# Patient Record
Sex: FEMALE | Race: WHITE | NOT HISPANIC OR LATINO
[De-identification: names, ages, dates, MRNs, and addresses within clinical notes are randomized per-mention and may not be internally consistent; named-entity substitution may affect disease eponyms.]

---

## 2018-07-18 ENCOUNTER — TRANSCRIPTION ENCOUNTER (OUTPATIENT)
Age: 33
End: 2018-07-18

## 2018-07-19 ENCOUNTER — OUTPATIENT (OUTPATIENT)
Dept: OUTPATIENT SERVICES | Facility: HOSPITAL | Age: 33
LOS: 1 days | End: 2018-07-19
Payer: COMMERCIAL

## 2018-07-19 ENCOUNTER — RESULT REVIEW (OUTPATIENT)
Age: 33
End: 2018-07-19

## 2018-07-19 VITALS
DIASTOLIC BLOOD PRESSURE: 65 MMHG | TEMPERATURE: 99 F | HEIGHT: 62.5 IN | RESPIRATION RATE: 14 BRPM | HEART RATE: 80 BPM | SYSTOLIC BLOOD PRESSURE: 99 MMHG | WEIGHT: 113.98 LBS | OXYGEN SATURATION: 98 %

## 2018-07-19 VITALS
OXYGEN SATURATION: 100 % | HEART RATE: 72 BPM | SYSTOLIC BLOOD PRESSURE: 109 MMHG | RESPIRATION RATE: 18 BRPM | DIASTOLIC BLOOD PRESSURE: 66 MMHG | TEMPERATURE: 98 F

## 2018-07-19 DIAGNOSIS — Z98.891 HISTORY OF UTERINE SCAR FROM PREVIOUS SURGERY: Chronic | ICD-10-CM

## 2018-07-19 DIAGNOSIS — O02.1 MISSED ABORTION: ICD-10-CM

## 2018-07-19 DIAGNOSIS — Z98.890 OTHER SPECIFIED POSTPROCEDURAL STATES: Chronic | ICD-10-CM

## 2018-07-19 LAB
BLD GP AB SCN SERPL QL: NEGATIVE — SIGNIFICANT CHANGE UP
HCT VFR BLD CALC: 35.5 % — SIGNIFICANT CHANGE UP (ref 34.5–45)
HGB BLD-MCNC: 13 G/DL — SIGNIFICANT CHANGE UP (ref 11.5–15.5)
MCHC RBC-ENTMCNC: 34.1 PG — HIGH (ref 27–34)
MCHC RBC-ENTMCNC: 36.6 GM/DL — HIGH (ref 32–36)
MCV RBC AUTO: 93.1 FL — SIGNIFICANT CHANGE UP (ref 80–100)
PLATELET # BLD AUTO: 292 K/UL — SIGNIFICANT CHANGE UP (ref 150–400)
RBC # BLD: 3.81 M/UL — SIGNIFICANT CHANGE UP (ref 3.8–5.2)
RBC # FLD: 11.5 % — SIGNIFICANT CHANGE UP (ref 10.3–14.5)
RH IG SCN BLD-IMP: POSITIVE — SIGNIFICANT CHANGE UP
WBC # BLD: 11.1 K/UL — HIGH (ref 3.8–10.5)
WBC # FLD AUTO: 11.1 K/UL — HIGH (ref 3.8–10.5)

## 2018-07-19 PROCEDURE — 88233 TISSUE CULTURE SKIN/BIOPSY: CPT

## 2018-07-19 PROCEDURE — 86900 BLOOD TYPING SEROLOGIC ABO: CPT

## 2018-07-19 PROCEDURE — 88305 TISSUE EXAM BY PATHOLOGIST: CPT

## 2018-07-19 PROCEDURE — 86901 BLOOD TYPING SEROLOGIC RH(D): CPT

## 2018-07-19 PROCEDURE — 86850 RBC ANTIBODY SCREEN: CPT

## 2018-07-19 PROCEDURE — 88280 CHROMOSOME KARYOTYPE STUDY: CPT

## 2018-07-19 PROCEDURE — 88264 CHROMOSOME ANALYSIS 20-25: CPT

## 2018-07-19 PROCEDURE — 59820 CARE OF MISCARRIAGE: CPT

## 2018-07-19 PROCEDURE — 85027 COMPLETE CBC AUTOMATED: CPT

## 2018-07-19 PROCEDURE — 88291 CYTO/MOLECULAR REPORT: CPT

## 2018-07-19 PROCEDURE — 88305 TISSUE EXAM BY PATHOLOGIST: CPT | Mod: 26

## 2018-07-19 RX ORDER — ONDANSETRON 8 MG/1
4 TABLET, FILM COATED ORAL ONCE
Refills: 0 | Status: DISCONTINUED | OUTPATIENT
Start: 2018-07-19 | End: 2018-08-03

## 2018-07-19 RX ORDER — ACETAMINOPHEN 500 MG
1000 TABLET ORAL ONCE
Refills: 0 | Status: COMPLETED | OUTPATIENT
Start: 2018-07-19 | End: 2018-07-19

## 2018-07-19 RX ORDER — LIDOCAINE HCL 20 MG/ML
0.2 VIAL (ML) INJECTION ONCE
Refills: 0 | Status: DISCONTINUED | OUTPATIENT
Start: 2018-07-19 | End: 2018-08-03

## 2018-07-19 RX ORDER — CELECOXIB 200 MG/1
200 CAPSULE ORAL ONCE
Refills: 0 | Status: DISCONTINUED | OUTPATIENT
Start: 2018-07-19 | End: 2018-08-03

## 2018-07-19 RX ORDER — OXYCODONE HYDROCHLORIDE 5 MG/1
5 TABLET ORAL ONCE
Refills: 0 | Status: DISCONTINUED | OUTPATIENT
Start: 2018-07-19 | End: 2018-07-19

## 2018-07-19 RX ORDER — SODIUM CHLORIDE 9 MG/ML
1000 INJECTION, SOLUTION INTRAVENOUS
Refills: 0 | Status: DISCONTINUED | OUTPATIENT
Start: 2018-07-19 | End: 2018-08-03

## 2018-07-19 RX ORDER — CELECOXIB 200 MG/1
200 CAPSULE ORAL ONCE
Refills: 0 | Status: COMPLETED | OUTPATIENT
Start: 2018-07-19 | End: 2018-07-19

## 2018-07-19 RX ORDER — SODIUM CHLORIDE 9 MG/ML
3 INJECTION INTRAMUSCULAR; INTRAVENOUS; SUBCUTANEOUS EVERY 8 HOURS
Refills: 0 | Status: DISCONTINUED | OUTPATIENT
Start: 2018-07-19 | End: 2018-08-03

## 2018-07-19 RX ADMIN — Medication 1000 MILLIGRAM(S): at 14:25

## 2018-07-19 RX ADMIN — CELECOXIB 200 MILLIGRAM(S): 200 CAPSULE ORAL at 14:25

## 2018-07-19 NOTE — PRE-ANESTHESIA EVALUATION ADULT - NSANTHPMHFT_GEN_ALL_CORE
GERD x 1 episode several days ago, none today  occasional nausea with pregnancy, no vomiting, none today GERD x 1 episode several days ago, none today  occasional nausea with pregnancy, no vomiting, none today  8.5 weeks by size  11 wks by date

## 2018-07-19 NOTE — H&P PST ADULT - HISTORY OF PRESENT ILLNESS
33 yo female. . LMP4/.  IUP at 11.5 weeks gestation.  recent prenatal sono revealed negative fetal heart tone. presents to PST scheduled for suction currettage for missed .  *** pt stated that she ate 1 cup of yogurt and drink some water this morning at 6am, afterwards she had maintained NPO status, Dr. Nesbitt is aware and is ok to proceed with planned procedure, s/w DAVID RN, Lenora who contacted anesthesiologist in chart.  pt will maintain NPO status 8h prior to procedure.*** 33 yo female. . LMP4/.  IUP at 11.5 weeks gestation.  recent prenatal sono revealed negative fetal heart tone. presents to PST scheduled for suction currettage for missed .  *** pt stated that she ate 1 cup of yogurt and drink some water this morning at 6am, afterwards she had maintained NPO status, Dr. Nesbitt is aware and is ok to proceed with planned procedure, s/w DAVID RN, Lenora who contacted anesthesiologist in charge.  pt will maintain NPO status 8h prior to procedure.***

## 2018-07-19 NOTE — ASU DISCHARGE PLAN (ADULT/PEDIATRIC). - NURSING INSTRUCTIONS
Tylenol/Motrin as needed for pain.  Next doses OK @ 8:00pm this evening if needed.  Pads only, nothing vaginally until seen by MD.  Call office for follow-up appt in 2-4 weeks.

## 2018-07-19 NOTE — H&P PST ADULT - ATTENDING COMMENTS
32 tyo at 11 weeks gestation, but a missed ab on sono today at 8w5d, admitted for suction d&c---rbas reviewed---lei turner md

## 2018-07-19 NOTE — H&P PST ADULT - NSANTHOSAYNRD_GEN_A_CORE
No. YONI screening performed.  STOP BANG Legend: 0-2 = LOW Risk; 3-4 = INTERMEDIATE Risk; 5-8 = HIGH Risk

## 2018-07-23 LAB — SURGICAL PATHOLOGY STUDY: SIGNIFICANT CHANGE UP

## 2018-07-31 LAB — CHROM ANALY OVERALL INTERP SPEC-IMP: SIGNIFICANT CHANGE UP

## 2019-06-04 PROBLEM — Z00.00 ENCOUNTER FOR PREVENTIVE HEALTH EXAMINATION: Status: ACTIVE | Noted: 2019-06-04

## 2019-06-05 ENCOUNTER — OUTPATIENT (OUTPATIENT)
Dept: OUTPATIENT SERVICES | Facility: HOSPITAL | Age: 34
LOS: 1 days | End: 2019-06-05
Payer: COMMERCIAL

## 2019-06-05 ENCOUNTER — LABORATORY RESULT (OUTPATIENT)
Age: 34
End: 2019-06-05

## 2019-06-05 ENCOUNTER — APPOINTMENT (OUTPATIENT)
Dept: ANTEPARTUM | Facility: CLINIC | Age: 34
End: 2019-06-05
Payer: COMMERCIAL

## 2019-06-05 ENCOUNTER — ASOB RESULT (OUTPATIENT)
Age: 34
End: 2019-06-05

## 2019-06-05 DIAGNOSIS — Z01.818 ENCOUNTER FOR OTHER PREPROCEDURAL EXAMINATION: ICD-10-CM

## 2019-06-05 DIAGNOSIS — Z98.891 HISTORY OF UTERINE SCAR FROM PREVIOUS SURGERY: Chronic | ICD-10-CM

## 2019-06-05 DIAGNOSIS — Z98.890 OTHER SPECIFIED POSTPROCEDURAL STATES: Chronic | ICD-10-CM

## 2019-06-05 PROCEDURE — 88267 CHROMOSOME ANALYS PLACENTA: CPT

## 2019-06-05 PROCEDURE — 76801 OB US < 14 WKS SINGLE FETUS: CPT

## 2019-06-05 PROCEDURE — 88271 CYTOGENETICS DNA PROBE: CPT

## 2019-06-05 PROCEDURE — 88275 CYTOGENETICS 100-300: CPT

## 2019-06-05 PROCEDURE — 76945 ECHO GUIDE VILLUS SAMPLING: CPT

## 2019-06-05 PROCEDURE — 88280 CHROMOSOME KARYOTYPE STUDY: CPT

## 2019-06-05 PROCEDURE — 59015 CHORION BIOPSY: CPT

## 2019-06-05 PROCEDURE — 76813 OB US NUCHAL MEAS 1 GEST: CPT

## 2019-06-05 PROCEDURE — 76945 ECHO GUIDE VILLUS SAMPLING: CPT | Mod: 26

## 2019-06-05 PROCEDURE — 36416 COLLJ CAPILLARY BLOOD SPEC: CPT

## 2019-06-05 PROCEDURE — 88235 TISSUE CULTURE PLACENTA: CPT

## 2019-06-05 PROCEDURE — 88285 CHROMOSOME COUNT ADDITIONAL: CPT

## 2019-06-05 PROCEDURE — 88291 CYTO/MOLECULAR REPORT: CPT

## 2019-06-05 PROCEDURE — 99241 OFFICE CONSULTATION NEW/ESTAB PATIENT 15 MIN: CPT | Mod: 25

## 2019-06-06 LAB — SUBTELOMERE ANALYSIS BLD/T FISH-IMP: SIGNIFICANT CHANGE UP

## 2019-06-12 LAB — CHROM ANALY OVERALL INTERP SPEC-IMP: SIGNIFICANT CHANGE UP

## 2019-06-17 ENCOUNTER — LABORATORY RESULT (OUTPATIENT)
Age: 34
End: 2019-06-17

## 2019-08-07 ENCOUNTER — ASOB RESULT (OUTPATIENT)
Age: 34
End: 2019-08-07

## 2019-08-07 ENCOUNTER — APPOINTMENT (OUTPATIENT)
Dept: ANTEPARTUM | Facility: CLINIC | Age: 34
End: 2019-08-07
Payer: COMMERCIAL

## 2019-08-07 PROCEDURE — 76817 TRANSVAGINAL US OBSTETRIC: CPT

## 2019-08-07 PROCEDURE — 76811 OB US DETAILED SNGL FETUS: CPT

## 2019-12-25 ENCOUNTER — INPATIENT (INPATIENT)
Facility: HOSPITAL | Age: 34
LOS: 1 days | Discharge: HOME | End: 2019-12-27
Attending: OBSTETRICS & GYNECOLOGY | Admitting: OBSTETRICS & GYNECOLOGY
Payer: COMMERCIAL

## 2019-12-25 VITALS — SYSTOLIC BLOOD PRESSURE: 126 MMHG | DIASTOLIC BLOOD PRESSURE: 74 MMHG

## 2019-12-25 DIAGNOSIS — Z3A.00 WEEKS OF GESTATION OF PREGNANCY NOT SPECIFIED: ICD-10-CM

## 2019-12-25 DIAGNOSIS — Z98.890 OTHER SPECIFIED POSTPROCEDURAL STATES: Chronic | ICD-10-CM

## 2019-12-25 DIAGNOSIS — O26.899 OTHER SPECIFIED PREGNANCY RELATED CONDITIONS, UNSPECIFIED TRIMESTER: ICD-10-CM

## 2019-12-25 DIAGNOSIS — Z98.891 HISTORY OF UTERINE SCAR FROM PREVIOUS SURGERY: Chronic | ICD-10-CM

## 2019-12-25 DIAGNOSIS — Z34.80 ENCOUNTER FOR SUPERVISION OF OTHER NORMAL PREGNANCY, UNSPECIFIED TRIMESTER: ICD-10-CM

## 2019-12-25 LAB
BASOPHILS # BLD AUTO: 0.04 K/UL — SIGNIFICANT CHANGE UP (ref 0–0.2)
BASOPHILS NFR BLD AUTO: 0.4 % — SIGNIFICANT CHANGE UP (ref 0–2)
EOSINOPHIL # BLD AUTO: 0.14 K/UL — SIGNIFICANT CHANGE UP (ref 0–0.5)
EOSINOPHIL NFR BLD AUTO: 1.3 % — SIGNIFICANT CHANGE UP (ref 0–6)
HCT VFR BLD CALC: 34.8 % — SIGNIFICANT CHANGE UP (ref 34.5–45)
HGB BLD-MCNC: 11.7 G/DL — SIGNIFICANT CHANGE UP (ref 11.5–15.5)
IMM GRANULOCYTES NFR BLD AUTO: 0.4 % — SIGNIFICANT CHANGE UP (ref 0–1.5)
LYMPHOCYTES # BLD AUTO: 2.44 K/UL — SIGNIFICANT CHANGE UP (ref 1–3.3)
LYMPHOCYTES # BLD AUTO: 22.3 % — SIGNIFICANT CHANGE UP (ref 13–44)
MCHC RBC-ENTMCNC: 31.2 PG — SIGNIFICANT CHANGE UP (ref 27–34)
MCHC RBC-ENTMCNC: 33.6 GM/DL — SIGNIFICANT CHANGE UP (ref 32–36)
MCV RBC AUTO: 92.8 FL — SIGNIFICANT CHANGE UP (ref 80–100)
MONOCYTES # BLD AUTO: 0.75 K/UL — SIGNIFICANT CHANGE UP (ref 0–0.9)
MONOCYTES NFR BLD AUTO: 6.8 % — SIGNIFICANT CHANGE UP (ref 2–14)
NEUTROPHILS # BLD AUTO: 7.55 K/UL — HIGH (ref 1.8–7.4)
NEUTROPHILS NFR BLD AUTO: 68.8 % — SIGNIFICANT CHANGE UP (ref 43–77)
NRBC # BLD: 0 /100 WBCS — SIGNIFICANT CHANGE UP (ref 0–0)
PLATELET # BLD AUTO: 267 K/UL — SIGNIFICANT CHANGE UP (ref 150–400)
RBC # BLD: 3.75 M/UL — LOW (ref 3.8–5.2)
RBC # FLD: 13.4 % — SIGNIFICANT CHANGE UP (ref 10.3–14.5)
WBC # BLD: 10.96 K/UL — HIGH (ref 3.8–10.5)
WBC # FLD AUTO: 10.96 K/UL — HIGH (ref 3.8–10.5)

## 2019-12-25 RX ORDER — SODIUM CHLORIDE 9 MG/ML
1000 INJECTION, SOLUTION INTRAVENOUS
Refills: 0 | Status: DISCONTINUED | OUTPATIENT
Start: 2019-12-25 | End: 2019-12-26

## 2019-12-25 RX ORDER — SODIUM CHLORIDE 9 MG/ML
1000 INJECTION, SOLUTION INTRAVENOUS
Refills: 0 | Status: DISCONTINUED | OUTPATIENT
Start: 2019-12-25 | End: 2019-12-27

## 2019-12-25 RX ORDER — OXYTOCIN 10 UNIT/ML
333.33 VIAL (ML) INJECTION
Qty: 20 | Refills: 0 | Status: DISCONTINUED | OUTPATIENT
Start: 2019-12-25 | End: 2019-12-27

## 2019-12-25 RX ORDER — OXYTOCIN 10 UNIT/ML
4 VIAL (ML) INJECTION
Qty: 30 | Refills: 0 | Status: DISCONTINUED | OUTPATIENT
Start: 2019-12-25 | End: 2019-12-27

## 2019-12-25 RX ORDER — CITRIC ACID/SODIUM CITRATE 300-500 MG
15 SOLUTION, ORAL ORAL EVERY 6 HOURS
Refills: 0 | Status: DISCONTINUED | OUTPATIENT
Start: 2019-12-25 | End: 2019-12-26

## 2019-12-25 RX ADMIN — SODIUM CHLORIDE 125 MILLILITER(S): 9 INJECTION, SOLUTION INTRAVENOUS at 23:41

## 2019-12-25 RX ADMIN — Medication 4 MILLIUNIT(S)/MIN: at 23:41

## 2019-12-26 LAB
BLD GP AB SCN SERPL QL: NEGATIVE — SIGNIFICANT CHANGE UP
RH IG SCN BLD-IMP: POSITIVE — SIGNIFICANT CHANGE UP
T PALLIDUM AB TITR SER: NEGATIVE — SIGNIFICANT CHANGE UP

## 2019-12-26 RX ORDER — GLYCERIN ADULT
1 SUPPOSITORY, RECTAL RECTAL AT BEDTIME
Refills: 0 | Status: DISCONTINUED | OUTPATIENT
Start: 2019-12-26 | End: 2019-12-27

## 2019-12-26 RX ORDER — MAGNESIUM HYDROXIDE 400 MG/1
30 TABLET, CHEWABLE ORAL
Refills: 0 | Status: DISCONTINUED | OUTPATIENT
Start: 2019-12-26 | End: 2019-12-27

## 2019-12-26 RX ORDER — TETANUS TOXOID, REDUCED DIPHTHERIA TOXOID AND ACELLULAR PERTUSSIS VACCINE, ADSORBED 5; 2.5; 8; 8; 2.5 [IU]/.5ML; [IU]/.5ML; UG/.5ML; UG/.5ML; UG/.5ML
0.5 SUSPENSION INTRAMUSCULAR ONCE
Refills: 0 | Status: DISCONTINUED | OUTPATIENT
Start: 2019-12-26 | End: 2019-12-27

## 2019-12-26 RX ORDER — KETOROLAC TROMETHAMINE 30 MG/ML
30 SYRINGE (ML) INJECTION ONCE
Refills: 0 | Status: DISCONTINUED | OUTPATIENT
Start: 2019-12-26 | End: 2019-12-27

## 2019-12-26 RX ORDER — IBUPROFEN 200 MG
600 TABLET ORAL EVERY 6 HOURS
Refills: 0 | Status: COMPLETED | OUTPATIENT
Start: 2019-12-26 | End: 2020-11-23

## 2019-12-26 RX ORDER — LANOLIN
1 OINTMENT (GRAM) TOPICAL EVERY 6 HOURS
Refills: 0 | Status: DISCONTINUED | OUTPATIENT
Start: 2019-12-26 | End: 2019-12-27

## 2019-12-26 RX ORDER — IBUPROFEN 200 MG
600 TABLET ORAL EVERY 6 HOURS
Refills: 0 | Status: DISCONTINUED | OUTPATIENT
Start: 2019-12-26 | End: 2019-12-27

## 2019-12-26 RX ORDER — DIPHENHYDRAMINE HCL 50 MG
25 CAPSULE ORAL EVERY 6 HOURS
Refills: 0 | Status: DISCONTINUED | OUTPATIENT
Start: 2019-12-26 | End: 2019-12-27

## 2019-12-26 RX ORDER — DIBUCAINE 1 %
1 OINTMENT (GRAM) RECTAL EVERY 6 HOURS
Refills: 0 | Status: DISCONTINUED | OUTPATIENT
Start: 2019-12-26 | End: 2019-12-27

## 2019-12-26 RX ORDER — AER TRAVELER 0.5 G/1
1 SOLUTION RECTAL; TOPICAL EVERY 4 HOURS
Refills: 0 | Status: DISCONTINUED | OUTPATIENT
Start: 2019-12-26 | End: 2019-12-27

## 2019-12-26 RX ORDER — BENZOCAINE 10 %
1 GEL (GRAM) MUCOUS MEMBRANE EVERY 6 HOURS
Refills: 0 | Status: DISCONTINUED | OUTPATIENT
Start: 2019-12-26 | End: 2019-12-27

## 2019-12-26 RX ORDER — ACETAMINOPHEN 500 MG
975 TABLET ORAL
Refills: 0 | Status: DISCONTINUED | OUTPATIENT
Start: 2019-12-26 | End: 2019-12-27

## 2019-12-26 RX ORDER — OXYTOCIN 10 UNIT/ML
333.33 VIAL (ML) INJECTION
Qty: 20 | Refills: 0 | Status: DISCONTINUED | OUTPATIENT
Start: 2019-12-26 | End: 2019-12-27

## 2019-12-26 RX ORDER — HYDROCORTISONE 1 %
1 OINTMENT (GRAM) TOPICAL EVERY 6 HOURS
Refills: 0 | Status: DISCONTINUED | OUTPATIENT
Start: 2019-12-26 | End: 2019-12-27

## 2019-12-26 RX ORDER — SIMETHICONE 80 MG/1
80 TABLET, CHEWABLE ORAL EVERY 4 HOURS
Refills: 0 | Status: DISCONTINUED | OUTPATIENT
Start: 2019-12-26 | End: 2019-12-27

## 2019-12-26 RX ORDER — OXYCODONE HYDROCHLORIDE 5 MG/1
5 TABLET ORAL
Refills: 0 | Status: DISCONTINUED | OUTPATIENT
Start: 2019-12-26 | End: 2019-12-27

## 2019-12-26 RX ORDER — OXYCODONE HYDROCHLORIDE 5 MG/1
5 TABLET ORAL ONCE
Refills: 0 | Status: DISCONTINUED | OUTPATIENT
Start: 2019-12-26 | End: 2019-12-27

## 2019-12-26 RX ORDER — PRAMOXINE HYDROCHLORIDE 150 MG/15G
1 AEROSOL, FOAM RECTAL EVERY 4 HOURS
Refills: 0 | Status: DISCONTINUED | OUTPATIENT
Start: 2019-12-26 | End: 2019-12-27

## 2019-12-26 RX ORDER — SODIUM CHLORIDE 9 MG/ML
3 INJECTION INTRAMUSCULAR; INTRAVENOUS; SUBCUTANEOUS EVERY 8 HOURS
Refills: 0 | Status: DISCONTINUED | OUTPATIENT
Start: 2019-12-26 | End: 2019-12-27

## 2019-12-26 RX ADMIN — Medication 975 MILLIGRAM(S): at 20:37

## 2019-12-26 RX ADMIN — Medication 600 MILLIGRAM(S): at 11:55

## 2019-12-26 RX ADMIN — Medication 975 MILLIGRAM(S): at 21:45

## 2019-12-26 RX ADMIN — Medication 975 MILLIGRAM(S): at 16:15

## 2019-12-26 RX ADMIN — Medication 600 MILLIGRAM(S): at 11:23

## 2019-12-26 RX ADMIN — Medication 975 MILLIGRAM(S): at 15:48

## 2019-12-27 ENCOUNTER — TRANSCRIPTION ENCOUNTER (OUTPATIENT)
Age: 34
End: 2019-12-27

## 2019-12-27 VITALS
SYSTOLIC BLOOD PRESSURE: 103 MMHG | RESPIRATION RATE: 18 BRPM | HEART RATE: 68 BPM | TEMPERATURE: 98 F | OXYGEN SATURATION: 98 % | DIASTOLIC BLOOD PRESSURE: 69 MMHG

## 2019-12-27 DIAGNOSIS — O34.219 MATERNAL CARE FOR UNSPECIFIED TYPE SCAR FROM PREVIOUS CESAREAN DELIVERY: ICD-10-CM

## 2019-12-27 LAB
HCT VFR BLD CALC: 34.1 % — LOW (ref 34.5–45)
HGB BLD-MCNC: 11.3 G/DL — LOW (ref 11.5–15.5)

## 2019-12-27 PROCEDURE — 86850 RBC ANTIBODY SCREEN: CPT

## 2019-12-27 PROCEDURE — 86780 TREPONEMA PALLIDUM: CPT

## 2019-12-27 PROCEDURE — 59050 FETAL MONITOR W/REPORT: CPT

## 2019-12-27 PROCEDURE — 85027 COMPLETE CBC AUTOMATED: CPT

## 2019-12-27 PROCEDURE — 86900 BLOOD TYPING SEROLOGIC ABO: CPT

## 2019-12-27 PROCEDURE — 85018 HEMOGLOBIN: CPT

## 2019-12-27 PROCEDURE — 59025 FETAL NON-STRESS TEST: CPT

## 2019-12-27 PROCEDURE — G0463: CPT

## 2019-12-27 PROCEDURE — 85014 HEMATOCRIT: CPT

## 2019-12-27 PROCEDURE — 86901 BLOOD TYPING SEROLOGIC RH(D): CPT

## 2019-12-27 RX ORDER — ACETAMINOPHEN 500 MG
3 TABLET ORAL
Qty: 0 | Refills: 0 | DISCHARGE
Start: 2019-12-27

## 2019-12-27 RX ORDER — IBUPROFEN 200 MG
1 TABLET ORAL
Qty: 0 | Refills: 0 | DISCHARGE
Start: 2019-12-27

## 2019-12-27 RX ADMIN — Medication 600 MILLIGRAM(S): at 12:30

## 2019-12-27 RX ADMIN — Medication 600 MILLIGRAM(S): at 01:42

## 2019-12-27 RX ADMIN — Medication 600 MILLIGRAM(S): at 02:30

## 2019-12-27 RX ADMIN — Medication 600 MILLIGRAM(S): at 11:28

## 2019-12-27 NOTE — DISCHARGE NOTE OB - CARE PLAN
Principal Discharge DX:	 (vaginal birth after )  Goal:	recovery  Assessment and plan of treatment:	Follow up in office in 6 weeks for postpartum visit.

## 2019-12-27 NOTE — DISCHARGE NOTE OB - PATIENT PORTAL LINK FT
You can access the FollowMyHealth Patient Portal offered by [facility] by registering at the following website: [email or web address]. By joining [employer]’s FollowMyHealth portal, you will also be able to view your health information using other applications (apps) compatible with our system.

## 2019-12-27 NOTE — PROGRESS NOTE ADULT - SUBJECTIVE AND OBJECTIVE BOX
OB Progress Note:  PPD#1    S: 32yo  PPD#1 s/p . Patient feels well. Pain is well controlled. She is tolerating a regular diet and passing flatus. She is voiding spontaneously, and ambulating without difficulty. Denies CP/SOB. Denies lightheadedness/dizziness. Denies N/V.    O:  Vitals:  Vital Signs Last 24 Hrs  T(C): 36.6 (27 Dec 2019 06:10), Max: 36.7 (26 Dec 2019 09:23)  T(F): 97.9 (27 Dec 2019 06:10), Max: 98 (26 Dec 2019 09:23)  HR: 68 (27 Dec 2019 06:10) (68 - 81)  BP: 103/69 (27 Dec 2019 06:10) (90/60 - 128/81)  BP(mean): --  RR: 18 (27 Dec 2019 06:10) (18 - 18)  SpO2: 98% (27 Dec 2019 06:10) (98% - 99%)    MEDICATIONS  (STANDING):  acetaminophen   Tablet .. 975 milliGRAM(s) Oral <User Schedule>  dextrose 5% + lactated ringers. 1000 milliLiter(s) (125 mL/Hr) IV Continuous <Continuous>  diphtheria/tetanus/pertussis (acellular) Vaccine (ADAcel) 0.5 milliLiter(s) IntraMuscular once  ibuprofen  Tablet. 600 milliGRAM(s) Oral every 6 hours  ketorolac   Injectable 30 milliGRAM(s) IV Push once  oxytocin Infusion 333.333 milliUNIT(s)/Min (1000 mL/Hr) IV Continuous <Continuous>  oxytocin Infusion 4 milliUNIT(s)/Min (4 mL/Hr) IV Continuous <Continuous>  oxytocin Infusion 333.333 milliUNIT(s)/Min (1000 mL/Hr) IV Continuous <Continuous>  prenatal multivitamin 1 Tablet(s) Oral daily  sodium chloride 0.9% lock flush 3 milliLiter(s) IV Push every 8 hours      Labs:  Blood type: A Positive  Rubella IgG: RPR: Negative                          11.7   10.96<H> >-----------< 267    ( 12-25 @ 22:55 )             34.8        Physical Exam:  General: NAD  Abdomen: soft, non-tender, non-distended, fundus firm  Vaginal: Lochia wnl  Extremities: No erythema/edema

## 2019-12-27 NOTE — DISCHARGE NOTE OB - CARE PROVIDER_API CALL
Mady Cole)  Obstetrics and Gynecology  [address] [place] [address]  Phone: [phone]  Fax: [phone]  Follow Up Time:

## 2019-12-27 NOTE — PROGRESS NOTE ADULT - PROBLEM SELECTOR PLAN 1
vaginal bleeding
- F/u H/H  - Pain well controlled, continue current pain regimen  - Increase ambulation, SCDs when not ambulating  - Continue regular diet    Nunu Herrera PGY-2

## 2021-01-01 NOTE — PATIENT PROFILE OB - PATIENT REPRESENTATIVE PHONE
[phone] I will SWITCH the dose or number of times a day I take the medications listed below when I get home from the hospital:  None

## 2021-06-24 NOTE — ASU PREOP CHECKLIST - PATIENT SENT TO
ASHVIN SPOKE WITH DR. GIVENS, OPHTALMOLOGIST, BLOOD WORK ORDERED AND LAB NOTIFIED TO 
COME AND DRAW PATIENT NOW BEFORE DISCHARGE. operating room

## 2021-10-04 ENCOUNTER — OUTPATIENT (OUTPATIENT)
Dept: OUTPATIENT SERVICES | Facility: HOSPITAL | Age: 36
LOS: 1 days | End: 2021-10-04
Payer: COMMERCIAL

## 2021-10-04 VITALS
RESPIRATION RATE: 18 BRPM | DIASTOLIC BLOOD PRESSURE: 66 MMHG | SYSTOLIC BLOOD PRESSURE: 101 MMHG | OXYGEN SATURATION: 98 % | TEMPERATURE: 98 F | WEIGHT: 119.05 LBS | HEIGHT: 62 IN | HEART RATE: 74 BPM

## 2021-10-04 DIAGNOSIS — Z98.890 OTHER SPECIFIED POSTPROCEDURAL STATES: Chronic | ICD-10-CM

## 2021-10-04 DIAGNOSIS — O02.1 MISSED ABORTION: ICD-10-CM

## 2021-10-04 DIAGNOSIS — Z11.52 ENCOUNTER FOR SCREENING FOR COVID-19: ICD-10-CM

## 2021-10-04 DIAGNOSIS — Z98.891 HISTORY OF UTERINE SCAR FROM PREVIOUS SURGERY: Chronic | ICD-10-CM

## 2021-10-04 DIAGNOSIS — Z01.818 ENCOUNTER FOR OTHER PREPROCEDURAL EXAMINATION: ICD-10-CM

## 2021-10-04 LAB
BLD GP AB SCN SERPL QL: NEGATIVE — SIGNIFICANT CHANGE UP
HCT VFR BLD CALC: 36.3 % — SIGNIFICANT CHANGE UP (ref 34.5–45)
HGB BLD-MCNC: 12.2 G/DL — SIGNIFICANT CHANGE UP (ref 11.5–15.5)
MCHC RBC-ENTMCNC: 30.4 PG — SIGNIFICANT CHANGE UP (ref 27–34)
MCHC RBC-ENTMCNC: 33.6 GM/DL — SIGNIFICANT CHANGE UP (ref 32–36)
MCV RBC AUTO: 90.5 FL — SIGNIFICANT CHANGE UP (ref 80–100)
NRBC # BLD: 0 /100 WBCS — SIGNIFICANT CHANGE UP (ref 0–0)
PLATELET # BLD AUTO: 337 K/UL — SIGNIFICANT CHANGE UP (ref 150–400)
RBC # BLD: 4.01 M/UL — SIGNIFICANT CHANGE UP (ref 3.8–5.2)
RBC # FLD: 12.7 % — SIGNIFICANT CHANGE UP (ref 10.3–14.5)
RH IG SCN BLD-IMP: POSITIVE — SIGNIFICANT CHANGE UP
WBC # BLD: 7.74 K/UL — SIGNIFICANT CHANGE UP (ref 3.8–10.5)
WBC # FLD AUTO: 7.74 K/UL — SIGNIFICANT CHANGE UP (ref 3.8–10.5)

## 2021-10-04 PROCEDURE — 86901 BLOOD TYPING SEROLOGIC RH(D): CPT

## 2021-10-04 PROCEDURE — G0463: CPT

## 2021-10-04 PROCEDURE — U0005: CPT

## 2021-10-04 PROCEDURE — 86850 RBC ANTIBODY SCREEN: CPT

## 2021-10-04 PROCEDURE — 86900 BLOOD TYPING SEROLOGIC ABO: CPT

## 2021-10-04 PROCEDURE — 85027 COMPLETE CBC AUTOMATED: CPT

## 2021-10-04 PROCEDURE — C9803: CPT

## 2021-10-04 PROCEDURE — U0003: CPT

## 2021-10-04 RX ORDER — LIDOCAINE HCL 20 MG/ML
0.2 VIAL (ML) INJECTION ONCE
Refills: 0 | Status: DISCONTINUED | OUTPATIENT
Start: 2021-10-06 | End: 2021-10-20

## 2021-10-04 RX ORDER — SODIUM CHLORIDE 9 MG/ML
3 INJECTION INTRAMUSCULAR; INTRAVENOUS; SUBCUTANEOUS EVERY 8 HOURS
Refills: 0 | Status: DISCONTINUED | OUTPATIENT
Start: 2021-10-06 | End: 2021-10-20

## 2021-10-04 NOTE — H&P PST ADULT - NS SC CAGE ALCOHOL CUT DOWN
----- Message from Artur Alejandro sent at 5/10/2017  8:49 AM CDT -----  Contact: Marisela  [phone]  Patient's daughter would like a call back from the office to discuss  Pt appointment on  05/12/17 want to no why he have to go . Please advise , Thanks !   no

## 2021-10-04 NOTE — H&P PST ADULT - HISTORY OF PRESENT ILLNESS
36 yo female. . LMP 21. Presents to [facility] for a scheduled Dilation Curettage for Missed  on 10/6/2021. No other significant past medical hx. Denies recent fevers, chills, cough, chest pain or SOB and feels well otherwise. COVID testing completed today at [facility].

## 2021-10-04 NOTE — H&P PST ADULT - NSICDXFAMILYHX_GEN_ALL_CORE_FT
FAMILY HISTORY:  Father  Still living? Yes, Estimated age: Age Unknown  Family hx of prostate cancer, Age at diagnosis: Age Unknown

## 2021-10-04 NOTE — H&P PST ADULT - PROBLEM SELECTOR PLAN 1
PT SCHEDULED FOR SX ON 10/6/2021. SURGICAL INSTRUCTIONS REVIEWED W/ PT. COVID TESTING COMPLETED AT [facility] TODAY 10/4/21.

## 2021-10-04 NOTE — H&P PST ADULT - NSICDXPASTSURGICALHX_GEN_ALL_CORE_FT
PAST SURGICAL HISTORY:  History of D&C 2018    S/P  section x1    S/P correction of deviated nasal septum

## 2021-10-05 ENCOUNTER — TRANSCRIPTION ENCOUNTER (OUTPATIENT)
Age: 36
End: 2021-10-05

## 2021-10-05 LAB — SARS-COV-2 RNA SPEC QL NAA+PROBE: SIGNIFICANT CHANGE UP

## 2021-10-05 RX ORDER — SODIUM CHLORIDE 9 MG/ML
1000 INJECTION, SOLUTION INTRAVENOUS
Refills: 0 | Status: DISCONTINUED | OUTPATIENT
Start: 2021-10-06 | End: 2021-10-20

## 2021-10-05 RX ORDER — IBUPROFEN 200 MG
400 TABLET ORAL ONCE
Refills: 0 | Status: DISCONTINUED | OUTPATIENT
Start: 2021-10-06 | End: 2021-10-20

## 2021-10-06 ENCOUNTER — RESULT REVIEW (OUTPATIENT)
Age: 36
End: 2021-10-06

## 2021-10-06 ENCOUNTER — OUTPATIENT (OUTPATIENT)
Dept: OUTPATIENT SERVICES | Facility: HOSPITAL | Age: 36
LOS: 1 days | End: 2021-10-06
Payer: COMMERCIAL

## 2021-10-06 VITALS
HEART RATE: 71 BPM | RESPIRATION RATE: 18 BRPM | SYSTOLIC BLOOD PRESSURE: 96 MMHG | OXYGEN SATURATION: 100 % | TEMPERATURE: 98 F | DIASTOLIC BLOOD PRESSURE: 55 MMHG

## 2021-10-06 VITALS
WEIGHT: 119.05 LBS | HEIGHT: 62 IN | DIASTOLIC BLOOD PRESSURE: 61 MMHG | OXYGEN SATURATION: 100 % | HEART RATE: 68 BPM | RESPIRATION RATE: 16 BRPM | SYSTOLIC BLOOD PRESSURE: 103 MMHG | TEMPERATURE: 99 F

## 2021-10-06 DIAGNOSIS — O02.1 MISSED ABORTION: ICD-10-CM

## 2021-10-06 DIAGNOSIS — Z98.890 OTHER SPECIFIED POSTPROCEDURAL STATES: Chronic | ICD-10-CM

## 2021-10-06 DIAGNOSIS — Z98.891 HISTORY OF UTERINE SCAR FROM PREVIOUS SURGERY: Chronic | ICD-10-CM

## 2021-10-06 PROCEDURE — 88305 TISSUE EXAM BY PATHOLOGIST: CPT

## 2021-10-06 PROCEDURE — 88264 CHROMOSOME ANALYSIS 20-25: CPT

## 2021-10-06 PROCEDURE — 86901 BLOOD TYPING SEROLOGIC RH(D): CPT

## 2021-10-06 PROCEDURE — 88305 TISSUE EXAM BY PATHOLOGIST: CPT | Mod: 26

## 2021-10-06 PROCEDURE — 88233 TISSUE CULTURE SKIN/BIOPSY: CPT

## 2021-10-06 PROCEDURE — 86900 BLOOD TYPING SEROLOGIC ABO: CPT

## 2021-10-06 PROCEDURE — 88280 CHROMOSOME KARYOTYPE STUDY: CPT

## 2021-10-06 PROCEDURE — 86850 RBC ANTIBODY SCREEN: CPT

## 2021-10-06 PROCEDURE — 59820 CARE OF MISCARRIAGE: CPT

## 2021-10-06 NOTE — ASU DISCHARGE PLAN (ADULT/PEDIATRIC) - CARE PROVIDER_API CALL
Philip Nesbitt)  Obstetrics and Gynecology  [address] [place] [address], [address]  [place] [address]  Phone: [phone]  Fax: [phone]  Follow Up Time:

## 2021-10-06 NOTE — ASU DISCHARGE PLAN (ADULT/PEDIATRIC) - NURSING INSTRUCTIONS
You may start taking ibuprofen or tylenol after 6 pm tonight as needed.  Instead of taking these 2 medications together, alternate and separate by 4 hours for pain relief.

## 2021-10-06 NOTE — BRIEF OPERATIVE NOTE - NSICDXBRIEFPROCEDURE_GEN_ALL_CORE_FT
PROCEDURES:  Dilation and curettage, uterus, using suction, for missed first trimester  06-Oct-2021 12:21:03  Philip Nesbitt

## 2021-10-12 LAB — SURGICAL PATHOLOGY STUDY: SIGNIFICANT CHANGE UP

## 2021-11-01 LAB — CHROM ANALY OVERALL INTERP SPEC-IMP: SIGNIFICANT CHANGE UP

## 2021-11-16 NOTE — ASU PREOP CHECKLIST - NS PREOP CHK HIBICLENS NA
From: Emi Reinoso  To: Devan Vazquez  Sent: 11/16/2021 4:30 PM CST  Subject: covid vaccine    I received my third full dose Moderna booster this afternoon. I have now received a message on [employer] indicating that I need to schedule for another dose. Is this a messaging error?   Nya Morales N/A

## 2022-05-03 NOTE — PATIENT PROFILE OB - PURPOSEFUL PROACTIVE ROUNDING
[facility]  Cardiology Consultation Note     Tommie Courtney  1981  Requesting Provider: AYLEEN Felton  PCP: Huang Ochoa MD    ID:  Tommie Courtney is a 40 y.o. male who is  and resides in [place], [place].  He travels the country doing martial arts with [employer]    REASON FOR CONSULTATION:    • Familial Hyperlipidemia         Dear Jeanna ABBASI,    Thank you for referring Tommie Courtney to me for evaluation of hyperlipidemia and cardiac risk factors.  The patient has a documented history of hyperlipidemia with an LDL greater than 200 and total cholesterol of 300.  He has tried Livalo, Crestor, Lipitor and Pravachol and has not been able to tolerate as it causes him severe myalgias to the point he cannot ambulate nor get out of the bed.  He has a family history of coronary artery disease as his father had a heart attack at age 60 and required PCI.  He is concerned about heart disease prevention.  He is physically active as he does martial arts with [employer] and travels the country.  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  He does go to the gym and denies any exertional chest pain or shortness of breath.  He does not smoke and tries to lead a relatively healthy lifestyle.  He is concerned about premature CAD.          Past Medical History, Past Surgical History, Family history, Social History, and Medications were all reviewed with the patient today and updated as necessary.       Current Outpatient Medications:   •  Krill Oil (Omega-3) 500 MG capsule, Take  by mouth 2 (Two) Times a Day., Disp: , Rfl:   •  multivitamin with minerals (MULTIVITAMIN MEN PO), Take 1 tablet by mouth Daily., Disp: , Rfl:   •  ezetimibe (ZETIA) 10 MG tablet, Take 1 tablet by mouth Daily., Disp: 30 tablet, Rfl: 0    Allergies   Allergen Reactions   • Statins Myalgia         Past Medical History:   Diagnosis Date   • Anxiety    •  "Hyperlipidemia        Past Surgical History:   Procedure Laterality Date   • HEMORRHOIDECTOMY     • HERNIA REPAIR         Family History   Problem Relation Age of Onset   • Bell's palsy Mother    • Heart disease Father    • Hyperlipidemia Father    • Spina bifida Brother        Social History     Tobacco Use   • Smoking status: Never Smoker   • Smokeless tobacco: Never Used   Substance Use Topics   • Alcohol use: Not Currently       Review of Systems   Constitutional: Negative for malaise/fatigue.   Eyes: Negative for vision loss in left eye and vision loss in right eye.   Cardiovascular: Negative for chest pain, dyspnea on exertion, near-syncope, orthopnea, palpitations, paroxysmal nocturnal dyspnea and syncope.   Musculoskeletal: Negative for myalgias.   Neurological: Negative for brief paralysis, excessive daytime sleepiness, focal weakness, numbness, paresthesias and weakness.   All other systems reviewed and are negative.              /86 (BP Location: Left arm, Patient Position: Sitting, Cuff Size: Adult)   Pulse 75   Ht 182.9 cm (72\")   Wt 81.2 kg (179 lb)   SpO2 97%   BMI 24.28 kg/m²        Constitutional:       Appearance: Healthy appearance. Well-developed.   Eyes:      General: Lids are normal. No scleral icterus.     Conjunctiva/sclera: Conjunctivae normal.   HENT:      Head: Normocephalic and atraumatic.   Neck:      Thyroid: No thyromegaly.      Vascular: No carotid bruit or JVD.   Pulmonary:      Effort: Pulmonary effort is normal.      Breath sounds: Normal breath sounds. No wheezing. No rhonchi. No rales.   Cardiovascular:      Normal rate. Regular rhythm.      Murmurs: There is no murmur.      No gallop. No rub.   Pulses:     Intact distal pulses.   Edema:     Peripheral edema absent.   Abdominal:      General: There is no distension.      Palpations: Abdomen is soft. There is no abdominal mass.   Musculoskeletal:      Cervical back: Normal range of motion. Skin:     General: Skin is " warm and dry.      Findings: No rash.   Neurological:      General: No focal deficit present.      Mental Status: Alert and oriented to person, place, and time.      Gait: Gait is intact.   Psychiatric:         Attention and Perception: Attention normal.         Mood and Affect: Mood normal.         Behavior: Behavior normal.             ECG 12 Lead    Date/Time: 5/5/2022 11:18 AM  Performed by: Dianne Correia APRN  Authorized by: Dianne Correia APRN   Rhythm: sinus rhythm and sinus arrhythmia  BPM: 75    Clinical impression: normal ECG  Comments: QT/QTc 382/426            Lipid panel 12/2021 total cholesterol 289, triglycerides 112, HDL 47, direct        Problem List Items Addressed This Visit        Cardiac and Vasculature    Familial hyperlipidemia - Primary    Current Assessment & Plan     · Highly likely patient has familial hyperlipidemia as LDL has been documented between 190-249  · Intolerant to all statins including Livalo, Crestor, Lipitor and Pravachol  · Start Zetia 10 mg daily  · Repeat lipid panel in 4 weeks  · Will need Repatha or Praluent           Relevant Medications    ezetimibe (ZETIA) 10 MG tablet    Other Relevant Orders    Lipid Panel       Family History    Family history of cardiovascular disease    Current Assessment & Plan     · Obtain coronary calcium score                 It is highly likely the patient has familial hyperlipidemia as he has documented LDLs greater than 200 for quite some time.  He is intolerant to all statins due to severe myalgias.  We will do a trial of Zetia 10 mg daily and repeat lipid panel in 4 weeks but patient will require Praluent or Repatha.  We will obtain a coronary calcium score for risk stratification.         · Start Zetia 10 mg daily  · Repeat lipid panel in 4 weeks  · Will likely need Praluent or Repatha  · Obtain coronary calcium score for risk certification  Return in about 6 months (around 11/5/2022), or if symptoms worsen or fail to  improve, for Follow-up with Dr. Woods next visit.          AYLEEN Garcia  05/05/22  11:44 EDT   Patient

## 2022-05-11 ENCOUNTER — APPOINTMENT (OUTPATIENT)
Dept: INTERNAL MEDICINE | Facility: CLINIC | Age: 37
End: 2022-05-11

## 2022-10-03 ENCOUNTER — ASOB RESULT (OUTPATIENT)
Age: 37
End: 2022-10-03

## 2022-10-03 ENCOUNTER — APPOINTMENT (OUTPATIENT)
Dept: ANTEPARTUM | Facility: CLINIC | Age: 37
End: 2022-10-03

## 2022-10-03 PROCEDURE — 76811 OB US DETAILED SNGL FETUS: CPT

## 2022-10-03 PROCEDURE — 76817 TRANSVAGINAL US OBSTETRIC: CPT

## 2023-01-22 NOTE — PRE-ANESTHESIA EVALUATION ADULT - LAST ECHOCARDIOGRAM
denies
Keep your intake of vitamin K regular. The highest amount of vitamin K is found in green and leafy vegetables like broccoli, lettuces, cabbage, and spinach. You can eat these foods but keep the portion size the same. Changes in the amount you eat can affect your PT/INR blood test. Contact your doctor before making any major changes in your diet. Limit your alcohol intake.

## 2023-02-09 ENCOUNTER — INPATIENT (INPATIENT)
Facility: HOSPITAL | Age: 38
LOS: 0 days | Discharge: HOME | End: 2023-02-10
Attending: OBSTETRICS & GYNECOLOGY | Admitting: OBSTETRICS & GYNECOLOGY
Payer: COMMERCIAL

## 2023-02-09 VITALS — HEART RATE: 100 BPM | DIASTOLIC BLOOD PRESSURE: 98 MMHG | SYSTOLIC BLOOD PRESSURE: 125 MMHG

## 2023-02-09 DIAGNOSIS — Z98.890 OTHER SPECIFIED POSTPROCEDURAL STATES: Chronic | ICD-10-CM

## 2023-02-09 DIAGNOSIS — Z3A.39 39 WEEKS GESTATION OF PREGNANCY: ICD-10-CM

## 2023-02-09 DIAGNOSIS — Z98.891 HISTORY OF UTERINE SCAR FROM PREVIOUS SURGERY: Chronic | ICD-10-CM

## 2023-02-09 DIAGNOSIS — O26.899 OTHER SPECIFIED PREGNANCY RELATED CONDITIONS, UNSPECIFIED TRIMESTER: ICD-10-CM

## 2023-02-09 DIAGNOSIS — Z34.80 ENCOUNTER FOR SUPERVISION OF OTHER NORMAL PREGNANCY, UNSPECIFIED TRIMESTER: ICD-10-CM

## 2023-02-09 LAB
BASOPHILS # BLD AUTO: 0.03 K/UL — SIGNIFICANT CHANGE UP (ref 0–0.2)
BASOPHILS NFR BLD AUTO: 0.4 % — SIGNIFICANT CHANGE UP (ref 0–2)
BLD GP AB SCN SERPL QL: NEGATIVE — SIGNIFICANT CHANGE UP
COVID-19 SPIKE DOMAIN AB INTERP: POSITIVE
COVID-19 SPIKE DOMAIN ANTIBODY RESULT: >250 U/ML — HIGH
EOSINOPHIL # BLD AUTO: 0.05 K/UL — SIGNIFICANT CHANGE UP (ref 0–0.5)
EOSINOPHIL NFR BLD AUTO: 0.6 % — SIGNIFICANT CHANGE UP (ref 0–6)
HCT VFR BLD CALC: 34.3 % — LOW (ref 34.5–45)
HGB BLD-MCNC: 11.6 G/DL — SIGNIFICANT CHANGE UP (ref 11.5–15.5)
IMM GRANULOCYTES NFR BLD AUTO: 0.4 % — SIGNIFICANT CHANGE UP (ref 0–0.9)
LYMPHOCYTES # BLD AUTO: 1.98 K/UL — SIGNIFICANT CHANGE UP (ref 1–3.3)
LYMPHOCYTES # BLD AUTO: 23.8 % — SIGNIFICANT CHANGE UP (ref 13–44)
MCHC RBC-ENTMCNC: 30.4 PG — SIGNIFICANT CHANGE UP (ref 27–34)
MCHC RBC-ENTMCNC: 33.8 GM/DL — SIGNIFICANT CHANGE UP (ref 32–36)
MCV RBC AUTO: 89.8 FL — SIGNIFICANT CHANGE UP (ref 80–100)
MONOCYTES # BLD AUTO: 0.7 K/UL — SIGNIFICANT CHANGE UP (ref 0–0.9)
MONOCYTES NFR BLD AUTO: 8.4 % — SIGNIFICANT CHANGE UP (ref 2–14)
NEUTROPHILS # BLD AUTO: 5.52 K/UL — SIGNIFICANT CHANGE UP (ref 1.8–7.4)
NEUTROPHILS NFR BLD AUTO: 66.4 % — SIGNIFICANT CHANGE UP (ref 43–77)
NRBC # BLD: 0 /100 WBCS — SIGNIFICANT CHANGE UP (ref 0–0)
PLATELET # BLD AUTO: 223 K/UL — SIGNIFICANT CHANGE UP (ref 150–400)
RBC # BLD: 3.82 M/UL — SIGNIFICANT CHANGE UP (ref 3.8–5.2)
RBC # FLD: 13.8 % — SIGNIFICANT CHANGE UP (ref 10.3–14.5)
RH IG SCN BLD-IMP: POSITIVE — SIGNIFICANT CHANGE UP
SARS-COV-2 IGG+IGM SERPL QL IA: >250 U/ML — HIGH
SARS-COV-2 IGG+IGM SERPL QL IA: POSITIVE
SARS-COV-2 RNA SPEC QL NAA+PROBE: SIGNIFICANT CHANGE UP
T PALLIDUM AB TITR SER: NEGATIVE — SIGNIFICANT CHANGE UP
WBC # BLD: 8.31 K/UL — SIGNIFICANT CHANGE UP (ref 3.8–10.5)
WBC # FLD AUTO: 8.31 K/UL — SIGNIFICANT CHANGE UP (ref 3.8–10.5)

## 2023-02-09 RX ORDER — MAGNESIUM HYDROXIDE 400 MG/1
30 TABLET, CHEWABLE ORAL
Refills: 0 | Status: DISCONTINUED | OUTPATIENT
Start: 2023-02-09 | End: 2023-02-10

## 2023-02-09 RX ORDER — AER TRAVELER 0.5 G/1
1 SOLUTION RECTAL; TOPICAL EVERY 4 HOURS
Refills: 0 | Status: DISCONTINUED | OUTPATIENT
Start: 2023-02-09 | End: 2023-02-10

## 2023-02-09 RX ORDER — TETANUS TOXOID, REDUCED DIPHTHERIA TOXOID AND ACELLULAR PERTUSSIS VACCINE, ADSORBED 5; 2.5; 8; 8; 2.5 [IU]/.5ML; [IU]/.5ML; UG/.5ML; UG/.5ML; UG/.5ML
0.5 SUSPENSION INTRAMUSCULAR ONCE
Refills: 0 | Status: DISCONTINUED | OUTPATIENT
Start: 2023-02-09 | End: 2023-02-10

## 2023-02-09 RX ORDER — HYDROCORTISONE 1 %
1 OINTMENT (GRAM) TOPICAL EVERY 6 HOURS
Refills: 0 | Status: DISCONTINUED | OUTPATIENT
Start: 2023-02-09 | End: 2023-02-10

## 2023-02-09 RX ORDER — DIPHENHYDRAMINE HCL 50 MG
25 CAPSULE ORAL EVERY 6 HOURS
Refills: 0 | Status: DISCONTINUED | OUTPATIENT
Start: 2023-02-09 | End: 2023-02-10

## 2023-02-09 RX ORDER — CHLORHEXIDINE GLUCONATE 213 G/1000ML
1 SOLUTION TOPICAL ONCE
Refills: 0 | Status: DISCONTINUED | OUTPATIENT
Start: 2023-02-09 | End: 2023-02-09

## 2023-02-09 RX ORDER — SIMETHICONE 80 MG/1
80 TABLET, CHEWABLE ORAL EVERY 4 HOURS
Refills: 0 | Status: DISCONTINUED | OUTPATIENT
Start: 2023-02-09 | End: 2023-02-10

## 2023-02-09 RX ORDER — IBUPROFEN 200 MG
600 TABLET ORAL EVERY 6 HOURS
Refills: 0 | Status: COMPLETED | OUTPATIENT
Start: 2023-02-09 | End: 2024-01-08

## 2023-02-09 RX ORDER — IBUPROFEN 200 MG
600 TABLET ORAL EVERY 6 HOURS
Refills: 0 | Status: DISCONTINUED | OUTPATIENT
Start: 2023-02-09 | End: 2023-02-10

## 2023-02-09 RX ORDER — PRAMOXINE HYDROCHLORIDE 150 MG/15G
1 AEROSOL, FOAM RECTAL EVERY 4 HOURS
Refills: 0 | Status: DISCONTINUED | OUTPATIENT
Start: 2023-02-09 | End: 2023-02-10

## 2023-02-09 RX ORDER — SODIUM CHLORIDE 9 MG/ML
1000 INJECTION, SOLUTION INTRAVENOUS
Refills: 0 | Status: DISCONTINUED | OUTPATIENT
Start: 2023-02-09 | End: 2023-02-09

## 2023-02-09 RX ORDER — OXYCODONE HYDROCHLORIDE 5 MG/1
5 TABLET ORAL
Refills: 0 | Status: DISCONTINUED | OUTPATIENT
Start: 2023-02-09 | End: 2023-02-10

## 2023-02-09 RX ORDER — OXYTOCIN 10 UNIT/ML
333.33 VIAL (ML) INJECTION
Qty: 20 | Refills: 0 | Status: DISCONTINUED | OUTPATIENT
Start: 2023-02-09 | End: 2023-02-09

## 2023-02-09 RX ORDER — KETOROLAC TROMETHAMINE 30 MG/ML
30 SYRINGE (ML) INJECTION ONCE
Refills: 0 | Status: DISCONTINUED | OUTPATIENT
Start: 2023-02-09 | End: 2023-02-09

## 2023-02-09 RX ORDER — BENZOCAINE 10 %
1 GEL (GRAM) MUCOUS MEMBRANE EVERY 6 HOURS
Refills: 0 | Status: DISCONTINUED | OUTPATIENT
Start: 2023-02-09 | End: 2023-02-10

## 2023-02-09 RX ORDER — CITRIC ACID/SODIUM CITRATE 300-500 MG
15 SOLUTION, ORAL ORAL EVERY 6 HOURS
Refills: 0 | Status: DISCONTINUED | OUTPATIENT
Start: 2023-02-09 | End: 2023-02-09

## 2023-02-09 RX ORDER — LANOLIN
1 OINTMENT (GRAM) TOPICAL EVERY 6 HOURS
Refills: 0 | Status: DISCONTINUED | OUTPATIENT
Start: 2023-02-09 | End: 2023-02-10

## 2023-02-09 RX ORDER — DIBUCAINE 1 %
1 OINTMENT (GRAM) RECTAL EVERY 6 HOURS
Refills: 0 | Status: DISCONTINUED | OUTPATIENT
Start: 2023-02-09 | End: 2023-02-10

## 2023-02-09 RX ORDER — OXYCODONE HYDROCHLORIDE 5 MG/1
5 TABLET ORAL ONCE
Refills: 0 | Status: DISCONTINUED | OUTPATIENT
Start: 2023-02-09 | End: 2023-02-10

## 2023-02-09 RX ORDER — ACETAMINOPHEN 500 MG
975 TABLET ORAL
Refills: 0 | Status: DISCONTINUED | OUTPATIENT
Start: 2023-02-09 | End: 2023-02-10

## 2023-02-09 RX ORDER — OXYTOCIN 10 UNIT/ML
41.67 VIAL (ML) INJECTION
Qty: 20 | Refills: 0 | Status: DISCONTINUED | OUTPATIENT
Start: 2023-02-09 | End: 2023-02-10

## 2023-02-09 RX ORDER — SODIUM CHLORIDE 9 MG/ML
3 INJECTION INTRAMUSCULAR; INTRAVENOUS; SUBCUTANEOUS EVERY 8 HOURS
Refills: 0 | Status: DISCONTINUED | OUTPATIENT
Start: 2023-02-09 | End: 2023-02-10

## 2023-02-09 RX ADMIN — Medication 30 MILLIGRAM(S): at 10:17

## 2023-02-09 RX ADMIN — Medication 975 MILLIGRAM(S): at 18:31

## 2023-02-09 RX ADMIN — Medication 1 TABLET(S): at 14:58

## 2023-02-09 RX ADMIN — Medication 30 MILLIGRAM(S): at 09:40

## 2023-02-09 RX ADMIN — SODIUM CHLORIDE 125 MILLILITER(S): 9 INJECTION, SOLUTION INTRAVENOUS at 06:15

## 2023-02-09 RX ADMIN — Medication 975 MILLIGRAM(S): at 13:00

## 2023-02-09 RX ADMIN — Medication 600 MILLIGRAM(S): at 21:10

## 2023-02-09 RX ADMIN — Medication 125 MILLIUNIT(S)/MIN: at 08:42

## 2023-02-09 RX ADMIN — Medication 600 MILLIGRAM(S): at 15:33

## 2023-02-09 RX ADMIN — Medication 975 MILLIGRAM(S): at 17:52

## 2023-02-09 RX ADMIN — Medication 600 MILLIGRAM(S): at 14:58

## 2023-02-09 RX ADMIN — Medication 975 MILLIGRAM(S): at 12:34

## 2023-02-09 RX ADMIN — Medication 600 MILLIGRAM(S): at 20:07

## 2023-02-09 RX ADMIN — SODIUM CHLORIDE 125 MILLILITER(S): 9 INJECTION, SOLUTION INTRAVENOUS at 05:58

## 2023-02-09 NOTE — OB PROVIDER H&P - ASSESSMENT
A/P: 37yoF  @39.4 weeks gestation (AMPARO 23) admitted for PROM@430a. GBS negative. Pt requesting epidural. Pt desires TOLAC.   - Admit to L&D  - Routine Labs. IVF. Covid swab  - EFM/ [place]: continuous monitoring   - Expectant management   - GBS negative   - Elevated PPH risk 2/2 previous , desires TOLAC, 2u on hold   - Anesthesia consult - pt requesting epidural   - D/w Dr. Mick Meadows PA-C

## 2023-02-09 NOTE — OB RN TRIAGE NOTE - FALL HARM RISK - UNIVERSAL INTERVENTIONS
Bed in lowest position, wheels locked, appropriate side rails in place/Call bell, personal items and telephone in reach/Instruct patient to call for assistance before getting out of bed or chair/Non-slip footwear when patient is out of bed/[place] to call system/Physically safe environment - no spills, clutter or unnecessary equipment/Purposeful Proactive Rounding/Room/bathroom lighting operational, light cord in reach

## 2023-02-09 NOTE — OB RN PATIENT PROFILE - HOW OFTEN DO YOU HAVE A DRINK CONTAINING ALCOHOL?
Justine Manzano is a 57 year old female presenting for a consult as referred by PCP for   Shortness of breath [R06.02]   Personal history of COVID-19 [Z86.16]     Denies known Latex allergy or symptoms of Latex sensitivity.    Medications verified, no changes.  RX benefits verified  Social History     Tobacco Use   Smoking Status Never   Smokeless Tobacco Never     Health Maintenance Due   Topic Date Due   • COVID-19 Vaccine (1) Never done   • Shingles Vaccine (1 of 2) Never done   • Hepatitis B Vaccine (3 of 3 - 3-dose series) 01/26/2016   • Influenza Vaccine (1) 09/01/2022     Patient would like communication of their results via:        Cell Phone:   Telephone Information:   Mobile [phone]   Mobile Not on file.     Okay to leave a message containing results? Yes    
Never

## 2023-02-09 NOTE — OB RN DELIVERY SUMMARY - NS_SEPSISRSKCALC_OBGYN_ALL_OB_FT
EOS calculated successfully. EOS Risk Factor: 0.5/1000 live births ([facility] national incidence); GA=39w4d; Temp=98.6; ROM=4.083; GBS='Negative'; Antibiotics='No antibiotics or any antibiotics < 2 hrs prior to birth'

## 2023-02-09 NOTE — OB NEONATOLOGY/PEDIATRICIAN DELIVERY SUMMARY - NSPEDSNEONOTESA_OBGYN_ALL_OB_FT
Called by OB to attend   delivery due to shoulder dystocia, code 100 called. NICU team arrived ~ 1 MOL and infant was on warmer with vigorous cry noted. Baby is  product of a ____ week gestation born to a G __ P___    ___ year old female   Maternal labs include Blood Type  ____  , HIV ___ , RPR_____ , Hep B[ +/- ], GBS  ___ , rest is unremarkable. Maternal history is significant for ____________  . Pregnancy was complicated by  _____________  .   ROM at  ______ , approximately  _______ hrs.  Resuscitation included: warmed, dried, suctioned, stimulated. No palpable crepitus or stepoffs noted over clavicles. Melinda reflex present and equal bilaterally. Infant flexing and moving both upper extremities equally. Apgars were: _______ . EOS score _______. Admit to NBN. Called by OB to attend  [facility] delivery due to shoulder dystocia, code 100 called. NICU team arrived ~ 1 MOL and infant was on warmer with vigorous cry noted. Baby is  product of a 39.4 week gestation born to a 39 yo  female.  Maternal labs include Blood Type  A+, C-, HIV neg, RPR neg, Hep B[ - ], GBS  neg, rest is unremarkable. Maternal history is significant for h/o abnl pap smears h/o colposcopy; h/o anxiety (no meds), h/o D&C 2018. Pregnancy was complicated by R shoulder dystocia for 30 seconds at the time of delivery.   ROM at 04:30 , approximately  4 hrs.  Resuscitation included: warmed, dried, suctioned, stimulated. No palpable crepitus or stepoffs noted over clavicles. Melinda reflex present and equal bilaterally. Infant flexing and moving both upper extremities equally. Apgars were: 8/9. Admit to NBN.

## 2023-02-09 NOTE — OB PROVIDER DELIVERY SUMMARY - DELIVERY COMPLICATIONS; SHOULDER DYSTOCIA
After delivery of the fetal head in monae position, the head restituted to LOP.  The shoulder did not easily deliver, and a shoulder dystocia code was called.  The impacted shoulder was noted to be the right shoulder.  the pt was placed in [place], and given suprapubic pressure.  The Menticoglou procedure was then performed on the posterior, left shoulder and the posterior shoulder was then delivered.  The anterior shoulder was then delivered followed by the body.  The baby emerged vigorous.

## 2023-02-09 NOTE — OB PROVIDER H&P - ATTENDING COMMENTS
A/P: 37yoF  @39.4 weeks gestation (AMPARO 23) admitted for PROM@430a. GBS negative. Pt requesting epidural. Pt desires TOLAC.    Admit  labs  consents  efm/toco  ivh/clears  for epidural    Cece Barton  Ob attg

## 2023-02-09 NOTE — OB PROVIDER LABOR PROGRESS NOTE - ASSESSMENT
for epi  then arom forebag    Cece Barton  ob attg
will start pushing in 10-15 mins    Cece Barton  OB attg
progressing well  reexamine prn    Cece Barton  Ob attg

## 2023-02-09 NOTE — OB PROVIDER DELIVERY SUMMARY - NSPROVIDERDELIVERYNOTE_OBGYN_ALL_OB_FT
Pt presented in labor.  Progressed to FD, and pushed to under go .  After delivery of the fetal head in monae position, the head restituted to LOP.  The shoulder did not easily deliver, and a shoulder dystocia code was called.  The impacted shoulder was noted to be the right shoulder.  the pt was placed in [facility], and given suprapubic pressure.  The Menticoglou procedure was then performed on the posterior, left shoulder and the posterior shoulder was then delivered.  The anterior shoulder was then delivered followed by the body.  The baby emerged vigorous.  Cord was clamped and cut and baby handed to waiting peds.  Baby with full range of motion in all four extremities, and symmetric silvana.  baby boy, 9,9 apgars.  perineum and sphincter intact. Pt presented in labor.  Progressed to FD, and pushed to under go .  After delivery of the fetal head in monae position, the head restituted to LOP.  The shoulder did not easily deliver, and a shoulder dystocia code was called.  The impacted shoulder was noted to be the right shoulder.  the pt was placed in [facility], and given suprapubic pressure.  The Menticoglou procedure was then performed on the posterior, left shoulder and the posterior shoulder was then delivered.  The anterior shoulder was then delivered followed by the body.  The baby emerged vigorous.  Cord was clamped and cut and baby handed to waiting peds.  Baby with full range of motion in all four extremities, and symmetric silvana.  baby boy, 8,9 apgars.  Time of delivery from fetal head to body 30 seconds.   perineum and sphincter intact. Pt presented in labor.  Progressed to FD, and pushed to under go .  After delivery of the fetal head in monae position, the head restituted to LOP.  The shoulder did not easily deliver, and a shoulder dystocia code was called.  The impacted shoulder was noted to be the right shoulder.  the pt was placed in [facility], and given suprapubic pressure.  The Menticoglou procedure was then performed on the posterior, left shoulder and the posterior shoulder was then delivered.  The anterior shoulder was then delivered followed by the body.  The baby emerged vigorous.  Cord was clamped and cut and baby handed to waiting peds.  Baby with full range of motion in all four extremities, and symmetric silvana.  baby boy, 8,9 apgars.  Time of delivery from fetal head to body 30 seconds.   perineum and sphincter intact.  Delivery discussed in detail with pt and

## 2023-02-09 NOTE — OB RN DELIVERY SUMMARY - NSSELHIDDEN_OBGYN_ALL_OB_FT
[NS_DeliveryAttending1_OBGYN_ALL_OB_FT:[id number]],[NS_DeliveryRN_OBGYN_ALL_OB_FT:[id number]] [NS_DeliveryAttending1_OBGYN_ALL_OB_FT:[id number]=],[NS_DeliveryRN_OBGYN_ALL_OB_FT:[id number]=],[NS_DeliveryAssist1_OBGYN_ALL_OB_FT:[id number]]

## 2023-02-09 NOTE — OB PROVIDER DELIVERY SUMMARY - NSSELHIDDEN_OBGYN_ALL_OB_FT
[NS_DeliveryAttending1_OBGYN_ALL_OB_FT:[id number]=],[NS_DeliveryRN_OBGYN_ALL_OB_FT:[id number]]

## 2023-02-09 NOTE — OB PROVIDER H&P - HISTORY OF PRESENT ILLNESS
OB PA Admission H&P    37yoF  @39.4 weeks gestation (AMPARO 23) presents for ROL/ROR. Pt reports she felt small gush of fluid around 430a, and slow continuous leakage since, denies vaginal bleeding. Pt also reports painful irregular contractions every 3-5 minutes. Endorses +FM. GBS negative. Pt denies any other concerns.    – PNC: GBS negative. EFW 3200g.   – OBHx:   () pLTCS 2/2 breech presentation uncomplicated 7#2  (2016)   uncomplicated 8#8  (2019)   uncomplicated 8#10   ( & ) MAB x2 s/p D&C x2  – GynHx: h/o abnl pap smears h/o colposcopy; denies h/o fibroids, ovarian cysts, STDs  – PMH: denies h/o HTN, DM, asthma, thyroid disorders, bleeding disorders, h/o blood transfusions   – PSH: () pLTCS; () D&C; h/o deviated septum surgery    – Psych: h/o anxiety (no meds); denies h/o depression/PPD  – Social: denies alcohol/tobacco/drug use in pregnancy   – Meds: PNV   – Allergies: NKDA  – Will accept blood transfusions: Yes    Vital Signs Last 24 Hrs  T(C): 36.9 (2023 05:28), Max: 36.9 (2023 05:14)  T(F): 98.4 (2023 05:28), Max: 98.4 (2023 05:14)  HR: 91 (2023 05:48) (85 - 108)  BP: 114/65 (2023 05:28) (114/65 - 125/98)  RR: 18 (2023 05:28) (18 - 18)  SpO2: 98% (2023 05:48) (98% - 100%)    Gen: NAD  CV: RRR  Lungs: CTA b/l   Abd: gravid, non-tender  Ext: BLE non-edematous, no calf tenderness b/l     – Spec: grossly ruptured clear fluid, +nitrazine, +ferning   – VE: 4.5/80/-2  – FHT: baseline 120, mod variability, +accels, -decels  – [place]: q3-5 min   – EFW: 3200g   – Sono: vertex

## 2023-02-09 NOTE — OB RN PATIENT PROFILE - ALERT: PERTINENT HISTORY
Order loaded please sign if appropriate.     TSH (mcUnits/mL)   Date Value   11/21/2017 0.336 (L)      1st Trimester Sonogram/20 Week Level II Sonogram/Ultra Screen at 12 Weeks

## 2023-02-09 NOTE — OB RN DELIVERY SUMMARY - NS_CORDBLDGASA_OBGYN_ALL_OB
March 11, 2020      Gabriela Agrawal  [address] [facility] [id number]        Dear ,    We are writing to inform you of your test results.  The MRI of the brain is essentially normal.  There is a moderate amount of shrinkage of the brain.  This occurs in all people over time, but is slightly more than expected for your age.  There are other microscopic changes in the brain consistent with your known high blood pressure and high cholesterol.  There is no tumor, mass, stroke which is all good news.  Continue plan of care discussed the time of visit.     Resulted Orders   MR Brain w/o & w Contrast    Narrative    MRI OF THE BRAIN WITHOUT AND WITH CONTRAST 3/10/2020 2:32 PM     COMPARISON: None.    HISTORY: Memory loss     TECHNIQUE: Axial diffusion-weighted with ADC map, axial T2-weighted  with fat saturation, axial T1-weighted, axial turboFLAIR and coronal  T1-weighted images of the brain were acquired without intravenous  contrast. Following intravenous administration of gadolinium (7 mL  Gadavist), axial T1-weighted images of the brain were acquired.     FINDINGS: There is moderate diffuse cerebral volume loss. There are  numerous tiny scattered focal areas of abnormal T2 signal  hyperintensity in the cerebral white matter bilaterally that are  consistent with sequela of chronic small vessel ischemic disease.    The ventricles and basal cisterns are within normal limits in  configuration given the degree of cerebral volume loss. There is no  midline shift. There are no extra-axial fluid collections. There is no  evidence for stroke or acute intracranial hemorrhage. There is no  abnormal contrast enhancement in the brain or its coverings.    There is no sinusitis or mastoiditis.      Impression    IMPRESSION: Diffuse cerebral volume loss and cerebral white matter  changes consistent with chronic small vessel ischemic disease. No  evidence for acute intracranial pathology.    PORTER BOWENS MD        If you have any questions or concerns, please call the clinic at the number listed above.       Sincerely,        Coco Mejia DO/bmc                 Both arterial and venous

## 2023-02-09 NOTE — OB PROVIDER H&P - NSLOWPPHRISK_OBGYN_A_OB
Less than or equal to 4 previous vaginal births/No known bleeding disorder/No history of postpartum hemorrhage

## 2023-02-10 ENCOUNTER — TRANSCRIPTION ENCOUNTER (OUTPATIENT)
Age: 38
End: 2023-02-10

## 2023-02-10 VITALS
TEMPERATURE: 98 F | OXYGEN SATURATION: 97 % | DIASTOLIC BLOOD PRESSURE: 70 MMHG | RESPIRATION RATE: 18 BRPM | HEART RATE: 62 BPM | SYSTOLIC BLOOD PRESSURE: 106 MMHG

## 2023-02-10 DIAGNOSIS — O34.219 MATERNAL CARE FOR UNSPECIFIED TYPE SCAR FROM PREVIOUS CESAREAN DELIVERY: ICD-10-CM

## 2023-02-10 PROCEDURE — 86850 RBC ANTIBODY SCREEN: CPT

## 2023-02-10 PROCEDURE — 86769 SARS-COV-2 COVID-19 ANTIBODY: CPT

## 2023-02-10 PROCEDURE — 86900 BLOOD TYPING SEROLOGIC ABO: CPT

## 2023-02-10 PROCEDURE — 86780 TREPONEMA PALLIDUM: CPT

## 2023-02-10 PROCEDURE — U0003: CPT

## 2023-02-10 PROCEDURE — 85025 COMPLETE CBC W/AUTO DIFF WBC: CPT

## 2023-02-10 PROCEDURE — 59050 FETAL MONITOR W/REPORT: CPT

## 2023-02-10 PROCEDURE — 86901 BLOOD TYPING SEROLOGIC RH(D): CPT

## 2023-02-10 PROCEDURE — 87635 SARS-COV-2 COVID-19 AMP PRB: CPT

## 2023-02-10 RX ORDER — ACETAMINOPHEN 500 MG
3 TABLET ORAL
Qty: 0 | Refills: 0 | DISCHARGE
Start: 2023-02-10

## 2023-02-10 RX ORDER — IBUPROFEN 200 MG
1 TABLET ORAL
Qty: 0 | Refills: 0 | DISCHARGE
Start: 2023-02-10

## 2023-02-10 RX ADMIN — Medication 600 MILLIGRAM(S): at 03:25

## 2023-02-10 RX ADMIN — Medication 975 MILLIGRAM(S): at 12:59

## 2023-02-10 RX ADMIN — Medication 600 MILLIGRAM(S): at 04:25

## 2023-02-10 RX ADMIN — Medication 975 MILLIGRAM(S): at 12:29

## 2023-02-10 RX ADMIN — Medication 600 MILLIGRAM(S): at 09:38

## 2023-02-10 RX ADMIN — Medication 975 MILLIGRAM(S): at 00:09

## 2023-02-10 RX ADMIN — Medication 600 MILLIGRAM(S): at 10:08

## 2023-02-10 RX ADMIN — Medication 975 MILLIGRAM(S): at 01:15

## 2023-02-10 RX ADMIN — Medication 1 TABLET(S): at 12:29

## 2023-02-10 RX ADMIN — Medication 975 MILLIGRAM(S): at 05:55

## 2023-02-10 NOTE — DISCHARGE NOTE OB - NS MD DC FALL RISK RISK
For information on Fall & Injury Prevention, visit: [email or web address] OR  [email or web address] OR  https://www.cdc.gov/steadi/patient.html

## 2023-02-10 NOTE — DISCHARGE NOTE OB - PATIENT PORTAL LINK FT
You can access the FollowMyHealth Patient Portal offered by [facility] by registering at the following website: [email or web address]. By joining [employer]s FollowMyHealth portal, you will also be able to view your health information using other applications (apps) compatible with our system.

## 2023-02-10 NOTE — DISCHARGE NOTE OB - CARE PLAN
Principal Discharge DX:	, delivered  Assessment and plan of treatment:	reg diet, ambulating, pain control   1

## 2023-02-20 NOTE — PRE-ANESTHESIA EVALUATION ADULT - BP NONINVASIVE SYSTOLIC (MM HG)
ER Provider Note    Scribed for Jas Carr M.D. by Yoana Hernandez. 2/19/2023  6:22 PM    Primary Care Provider: Linda Baum M.D.    CHIEF COMPLAINT  Chief Complaint   Patient presents with    Vomiting     Starting yesterday, last emesis at 0500    Diarrhea     Starting yesterday    Other     No UOP since yesterday at 1430       HPI/ROS  LIMITATION TO HISTORY   Select: : None    OUTSIDE HISTORIAN(S):  Parent see below    Arnold Marr is a 9 m.o. male who presents to the ED with his parents for diarrhea onset last night. Mother reports he has had around 20 episodes of diarrhea. She states she has not noticed any urine production but notes it is difficult to tell because of his diarrhea. She notes the diarrhea looks like just water. Parents report he seems to be feeding fine and has taken 16 oz Pedialyte and 20 oz formula today. Mother admits to associated symptoms of mild cough(onset today) and vomiting (one episode at 5:00 AM today), but denies any fevers. Per triage note, they were seen in the ED yesterday for similar symptoms. No alleviating factors were reported. Mother denies anyone else at home who is sick.     The patient has history of eczema, but otherwise no other major past medical history, takes no daily medications, and has no allergies to medication. Vaccinations are up to date.    PAST MEDICAL HISTORY  Past Medical History:   Diagnosis Date    Patient denies medical problems      Vaccinations are UTD.     SURGICAL HISTORY  History reviewed. No pertinent surgical history.    FAMILY HISTORY  None pertinent    SOCIAL HISTORY   Patient is accompanied by his parents, whom he lives with.     CURRENT MEDICATIONS  Current Outpatient Medications   Medication Instructions    acetaminophen (TYLENOL) 160 MG/5ML Suspension 15 mg/kg, Oral, EVERY 4 HOURS PRN    acetaminophen (TYLENOL) 15 mg/kg, Oral, EVERY 4 HOURS PRN    ibuprofen (MOTRIN) 10 mg/kg, Oral, EVERY 6 HOURS PRN    ondansetron (ZOFRAN)  "2 mg, Oral, EVERY 6 HOURS PRN       ALLERGIES  Patient has no known allergies.    PHYSICAL EXAM  Pulse 143   Temp 37.7 °C (99.8 °F) (Rectal)   Resp 30   Ht 0.737 m (2' 5\")   Wt 11.1 kg (24 lb 7.5 oz)   SpO2 96%   BMI 20.46 kg/m²     Constitutional: Well developed, Well nourished, No acute distress, Non-toxic appearance.   HENT: Normocephalic, Atraumatic, Bilateral external ears normal, Oropharynx moist, No oral exudates, Tacky mucous membranes.  Eyes: PERRL, EOMI, Conjunctiva normal, No discharge.  Neck: Neck has normal range of motion, no tenderness, and is supple.   Lymphatic: No cervical lymphadenopathy noted.   Cardiovascular: Normal heart rate, Normal rhythm, No murmurs, No rubs, No gallops.   Thorax & Lungs: Normal breath sounds, No respiratory distress, No wheezing, No chest tenderness, No accessory muscle use, No stridor.  Skin: Warm, Dry, No erythema, Dry patches of skin diffusely.   Abdomen: Soft, No tenderness, No masses.  Neurologic: Alert. Moves all extremities equally.      COURSE & MEDICAL DECISION MAKING    ED Observation Status? No; Patient does not meet criteria for ED Observation.     INITIAL ASSESSMENT AND PLAN  Care Narrative:     6:22 PM - Patient was evaluated; Patient presents for evaluation of around 20 episodes of watery diarrhea onset last night. Mother states she has not noticed any urine production but notes it is difficult to tell because of his diarrhea. Parents report he seems to be feeding fine and has taken 16 oz Pedialyte and 20 oz formula today. Mother admits to associated symptoms of mild cough(onset today) and vomiting (one episode at 5:00 AM today), but denies any fevers. Exam reveals dry patches of skin diffusely and tacky mucous membranes.  His abdomen is soft and nontender and exam is not consistent with otitis media, pneumonia, appendicitis or meningitis.  This is most likely related to viral gastroenteritis.  The patient was medicated with ondansetron 2 mg dispertab " for his symptoms. I informed parents his symptoms are likely due to a viral illness.     7:31 PM - I reevaluated the patient at bedside. The patient drank well after Zofran. He appears much better. I informed the parents his symptoms are likely due to viral illness. I discussed plan for discharge and follow up as outlined below. The patient is stable for discharge at this time and will return for any new or worsening symptoms. Parents verbalize understanding and support with my plan for discharge.                DISPOSITION AND DISCUSSIONS  Decision tools and prescription drugs considered including, but not limited to:  ondansetron 2 mg .    DISPOSITION:  Patient will be discharged home with parent in stable condition.    FOLLOW UP:  Linda Baum M.D.  [address]  [place] [id number] [phone]      As needed, If symptoms worsen      Parent was given return precautions and verbalizes understanding. They will return for new or worsening symptoms.      FINAL IMPRESSION  1. Diarrhea, unspecified type    2. Vomiting, unspecified vomiting type, unspecified whether nausea present         IYoana (Kelly), am scribing for, and in the presence of, Jas Carr M.D..    Electronically signed by: Yoana Hernandez (Kelly), 2/19/2023    IJsa M.D. personally performed the services described in this documentation, as scribed by Yoana Hernandez in my presence, and it is both accurate and complete.    The note accurately reflects work and decisions made by me.  Jas Carr M.D.  2/19/2023  9:29 PM   125

## 2023-07-28 ENCOUNTER — APPOINTMENT (OUTPATIENT)
Dept: OBGYN | Facility: CLINIC | Age: 38
End: 2023-07-28
Payer: COMMERCIAL

## 2023-07-28 PROCEDURE — 99395 PREV VISIT EST AGE 18-39: CPT

## 2023-09-06 NOTE — ASU DISCHARGE PLAN (ADULT/PEDIATRIC). - LAUNCH MEDICATION RECONCILIATION
<<-----Click here for Discharge Medication Review Detail Level: Simple Hide Include Location In Plan Question?: No Detail Level: Detailed Rotation Flap Text: The defect edges were debeveled with a #15 scalpel blade. Given the location of the defect, shape of the defect and the proximity to free margins a rotation flap was deemed most appropriate. Using a sterile surgical marker, an appropriate rotation flap was drawn incorporating the defect and placing the expected incisions within the relaxed skin tension lines where possible. The area thus outlined was incised deep to adipose tissue with a #15 scalpel blade. The skin margins were undermined to an appropriate distance in all directions utilizing iris scissors. Following this, the designed flap was carried over into the primary defect and sutured into place.

## 2023-09-17 NOTE — BRIEF OPERATIVE NOTE - TYPE OF ANESTHESIA
Group Topic: BH Therapeutic Activity    Date: 9/17/2023  Start Time: 1045  End Time: 1200  Facilitators: Fahres, Catherine S, OT    Focus: Distress Tolerance  Number in attendance: 11    Method: Group  Attendance: Present  Participation: Moderate  Patient Response: Attentive and Easily frustrated  Mood: Anxious  Affect: Type: Anxious  Behavior/Socialization: Appropriate to group  Thought Process: Confused and Tracking  Task Performance: Follows directions  Patient Evaluation: Independent - full participation         General

## 2023-10-02 ENCOUNTER — APPOINTMENT (OUTPATIENT)
Dept: UROLOGY | Facility: CLINIC | Age: 38
End: 2023-10-02
Payer: COMMERCIAL

## 2023-10-02 VITALS
HEART RATE: 70 BPM | OXYGEN SATURATION: 97 % | HEIGHT: 62 IN | SYSTOLIC BLOOD PRESSURE: 102 MMHG | DIASTOLIC BLOOD PRESSURE: 67 MMHG | WEIGHT: 131 LBS | BODY MASS INDEX: 24.11 KG/M2

## 2023-10-02 PROCEDURE — 99204 OFFICE O/P NEW MOD 45 MIN: CPT

## 2023-11-16 ENCOUNTER — OUTPATIENT (OUTPATIENT)
Dept: OUTPATIENT SERVICES | Facility: HOSPITAL | Age: 38
LOS: 1 days | End: 2023-11-16
Payer: COMMERCIAL

## 2023-11-16 VITALS
WEIGHT: 130.07 LBS | HEIGHT: 63 IN | SYSTOLIC BLOOD PRESSURE: 102 MMHG | OXYGEN SATURATION: 98 % | HEART RATE: 71 BPM | DIASTOLIC BLOOD PRESSURE: 62 MMHG | RESPIRATION RATE: 20 BRPM | TEMPERATURE: 98 F

## 2023-11-16 DIAGNOSIS — Z98.891 HISTORY OF UTERINE SCAR FROM PREVIOUS SURGERY: Chronic | ICD-10-CM

## 2023-11-16 DIAGNOSIS — Z98.890 OTHER SPECIFIED POSTPROCEDURAL STATES: Chronic | ICD-10-CM

## 2023-11-16 DIAGNOSIS — N39.3 STRESS INCONTINENCE (FEMALE) (MALE): ICD-10-CM

## 2023-11-16 LAB
HCT VFR BLD CALC: 38.7 % — SIGNIFICANT CHANGE UP (ref 34.5–45)
HCT VFR BLD CALC: 38.7 % — SIGNIFICANT CHANGE UP (ref 34.5–45)
HGB BLD-MCNC: 13 G/DL — SIGNIFICANT CHANGE UP (ref 11.5–15.5)
HGB BLD-MCNC: 13 G/DL — SIGNIFICANT CHANGE UP (ref 11.5–15.5)
MCHC RBC-ENTMCNC: 30.9 PG — SIGNIFICANT CHANGE UP (ref 27–34)
MCHC RBC-ENTMCNC: 30.9 PG — SIGNIFICANT CHANGE UP (ref 27–34)
MCHC RBC-ENTMCNC: 33.6 GM/DL — SIGNIFICANT CHANGE UP (ref 32–36)
MCHC RBC-ENTMCNC: 33.6 GM/DL — SIGNIFICANT CHANGE UP (ref 32–36)
MCV RBC AUTO: 91.9 FL — SIGNIFICANT CHANGE UP (ref 80–100)
MCV RBC AUTO: 91.9 FL — SIGNIFICANT CHANGE UP (ref 80–100)
NRBC # BLD: 0 /100 WBCS — SIGNIFICANT CHANGE UP (ref 0–0)
NRBC # BLD: 0 /100 WBCS — SIGNIFICANT CHANGE UP (ref 0–0)
PLATELET # BLD AUTO: 303 K/UL — SIGNIFICANT CHANGE UP (ref 150–400)
PLATELET # BLD AUTO: 303 K/UL — SIGNIFICANT CHANGE UP (ref 150–400)
RBC # BLD: 4.21 M/UL — SIGNIFICANT CHANGE UP (ref 3.8–5.2)
RBC # BLD: 4.21 M/UL — SIGNIFICANT CHANGE UP (ref 3.8–5.2)
RBC # FLD: 12.4 % — SIGNIFICANT CHANGE UP (ref 10.3–14.5)
RBC # FLD: 12.4 % — SIGNIFICANT CHANGE UP (ref 10.3–14.5)
WBC # BLD: 4.59 K/UL — SIGNIFICANT CHANGE UP (ref 3.8–10.5)
WBC # BLD: 4.59 K/UL — SIGNIFICANT CHANGE UP (ref 3.8–10.5)
WBC # FLD AUTO: 4.59 K/UL — SIGNIFICANT CHANGE UP (ref 3.8–10.5)
WBC # FLD AUTO: 4.59 K/UL — SIGNIFICANT CHANGE UP (ref 3.8–10.5)

## 2023-11-16 PROCEDURE — 85027 COMPLETE CBC AUTOMATED: CPT

## 2023-11-16 PROCEDURE — G0463: CPT

## 2023-11-16 RX ORDER — SODIUM CHLORIDE 9 MG/ML
1000 INJECTION, SOLUTION INTRAVENOUS
Refills: 0 | Status: DISCONTINUED | OUTPATIENT
Start: 2023-11-30 | End: 2023-12-14

## 2023-11-16 NOTE — H&P PST ADULT - ATTENDING COMMENTS
pt presents for urethral bulking, bulkamid for SERG. discussed risks and benefits, and she wants to proceed.

## 2023-11-16 NOTE — H&P PST ADULT - NSICDXPASTSURGICALHX_GEN_ALL_CORE_FT
PAST SURGICAL HISTORY:  History of D&C 2018    S/P  section x1    S/P correction of deviated nasal septum     S/P dilatation and curettage

## 2023-11-16 NOTE — H&P PST ADULT - MUSCULOSKELETAL
negative normal/ROM intact/normal gait/strength 5/5 bilateral upper extremities/strength 5/5 bilateral lower extremities no

## 2023-11-16 NOTE — H&P PST ADULT - HISTORY OF PRESENT ILLNESS
37 yr old female , with stress urge incontinence since 2016 after child birth , last childbirth 3/2023. Pt with c/o leakage with exercise, laughing, sneezing . Now coming in for urethral bulking bulkmaid on 2023    Pt had urine c/s- 2023- positive E Coli -  > 100,000 - Treated with  Phenazopyridine     37 yr old female , with stress urge incontinence since 2016 after child birth , last childbirth 3/2023. Pt with c/o leakage with exercise, laughing, sneezing . Now coming in for urethral bulking bulk maid on 2023    Pt had urine c/s- 2023- positive E Coli -  > 100,000 - Treated with  Phenazopyridine

## 2023-11-16 NOTE — H&P PST ADULT - ASSESSMENT
Airway:  normal    Mallampati-       Dental: Patient denies loose teeth    Activity :  dasi mets :     Airway:  normal    Mallampati-    2   Dental: Patient denies loose teeth    Activity : active all day with 4 kids   dasi mets : 9.89 dasi mets

## 2023-11-16 NOTE — H&P PST ADULT - TEMPERATURE IN FAHRENHEIT (DEGREES F)
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
98.1

## 2023-11-29 ENCOUNTER — TRANSCRIPTION ENCOUNTER (OUTPATIENT)
Age: 38
End: 2023-11-29

## 2023-11-30 ENCOUNTER — APPOINTMENT (OUTPATIENT)
Dept: UROLOGY | Facility: HOSPITAL | Age: 38
End: 2023-11-30

## 2023-11-30 ENCOUNTER — TRANSCRIPTION ENCOUNTER (OUTPATIENT)
Age: 38
End: 2023-11-30

## 2023-11-30 ENCOUNTER — OUTPATIENT (OUTPATIENT)
Dept: OUTPATIENT SERVICES | Facility: HOSPITAL | Age: 38
LOS: 1 days | End: 2023-11-30
Payer: COMMERCIAL

## 2023-11-30 VITALS
RESPIRATION RATE: 16 BRPM | TEMPERATURE: 98 F | SYSTOLIC BLOOD PRESSURE: 90 MMHG | WEIGHT: 130.07 LBS | OXYGEN SATURATION: 97 % | DIASTOLIC BLOOD PRESSURE: 63 MMHG | HEIGHT: 63 IN | HEART RATE: 72 BPM

## 2023-11-30 VITALS
OXYGEN SATURATION: 100 % | RESPIRATION RATE: 15 BRPM | DIASTOLIC BLOOD PRESSURE: 55 MMHG | TEMPERATURE: 98 F | HEART RATE: 71 BPM | SYSTOLIC BLOOD PRESSURE: 110 MMHG

## 2023-11-30 DIAGNOSIS — Z98.891 HISTORY OF UTERINE SCAR FROM PREVIOUS SURGERY: Chronic | ICD-10-CM

## 2023-11-30 DIAGNOSIS — Z98.890 OTHER SPECIFIED POSTPROCEDURAL STATES: Chronic | ICD-10-CM

## 2023-11-30 DIAGNOSIS — N39.3 STRESS INCONTINENCE (FEMALE) (MALE): ICD-10-CM

## 2023-11-30 PROBLEM — M41.9 SCOLIOSIS, UNSPECIFIED: Chronic | Status: ACTIVE | Noted: 2023-11-16

## 2023-11-30 PROCEDURE — 51715 ENDOSCOPIC INJECTION/IMPLANT: CPT

## 2023-11-30 PROCEDURE — 99261: CPT

## 2023-11-30 DEVICE — SYS BULKAMID URETHRAL BULKING 1ML: Type: IMPLANTABLE DEVICE | Status: FUNCTIONAL

## 2023-11-30 RX ORDER — CEFAZOLIN SODIUM 1 G
2000 VIAL (EA) INJECTION ONCE
Refills: 0 | Status: COMPLETED | OUTPATIENT
Start: 2023-11-30 | End: 2023-11-30

## 2023-11-30 RX ORDER — FENTANYL CITRATE 50 UG/ML
25 INJECTION INTRAVENOUS
Refills: 0 | Status: DISCONTINUED | OUTPATIENT
Start: 2023-11-30 | End: 2023-11-30

## 2023-11-30 RX ORDER — PHENAZOPYRIDINE HCL 100 MG
1 TABLET ORAL
Refills: 0 | DISCHARGE

## 2023-11-30 RX ORDER — LIDOCAINE HCL 20 MG/ML
0.2 VIAL (ML) INJECTION ONCE
Refills: 0 | Status: COMPLETED | OUTPATIENT
Start: 2023-11-30 | End: 2023-11-30

## 2023-11-30 RX ORDER — PHENAZOPYRIDINE HCL 100 MG
2 TABLET ORAL
Qty: 18 | Refills: 0
Start: 2023-11-30 | End: 2023-12-02

## 2023-11-30 RX ORDER — ONDANSETRON 8 MG/1
4 TABLET, FILM COATED ORAL ONCE
Refills: 0 | Status: DISCONTINUED | OUTPATIENT
Start: 2023-11-30 | End: 2023-12-14

## 2023-11-30 RX ADMIN — SODIUM CHLORIDE 100 MILLILITER(S): 9 INJECTION, SOLUTION INTRAVENOUS at 11:44

## 2023-11-30 NOTE — ASU DISCHARGE PLAN (ADULT/PEDIATRIC) - PAIN MANAGEMENT
Next dose of Tylenol will be on or after ___________ ,today/tonight and every 6 hours afterwards for pain management, do not take any Tylenol containing products until this time. Your first dose of Tylenol was given at ___________. Do not exceed more than 4000mg of Tylenol in one 24 hour setting. If no contraindications, you may alternate with Ibuprofen 3 hours after dose of Tylenol. Ibuprofen can be taken every 6 hours. next dose @___________/Take over the counter pain medication

## 2023-11-30 NOTE — PRE-ANESTHESIA EVALUATION ADULT - NSANTHBMIRD_ENT_A_CORE
Office visit 1/5/18 with GOLDEN Heller for Ct scan results. Results not in the system. Per patient would like to reschedule also complete CT at [place]. Patient transferred to scheduling for both CT and OV. Per patient    No

## 2023-11-30 NOTE — ASU DISCHARGE PLAN (ADULT/PEDIATRIC) - ASU DC SPECIAL INSTRUCTIONSFT
Please refer to Dr. Hagan's instruction sheet.   A prescription for pyridum was sent to your pharmacy to take as needed for urethral pain.

## 2023-11-30 NOTE — PRE-ANESTHESIA EVALUATION ADULT - NSANTHOSAYNRD_GEN_A_CORE
Breath Sounds equal & clear to percussion & auscultation, no accessory muscle use No. YONI screening performed.  STOP BANG Legend: 0-2 = LOW Risk; 3-4 = INTERMEDIATE Risk; 5-8 = HIGH Risk

## 2023-11-30 NOTE — ASU DISCHARGE PLAN (ADULT/PEDIATRIC) - NS MD DC FALL RISK RISK
For information on Fall & Injury Prevention, visit: [email or web address] OR  [email or web address] OR  https://www.cdc.gov/steadi/patient.html

## 2023-11-30 NOTE — ASU DISCHARGE PLAN (ADULT/PEDIATRIC) - "IF YOU OR YOUR GUARDIAN/FAMILY IS A SMOKER, IT IS IMPORTANT FOR YOUR HEALTH TO STOP SMOKING. PLEASE BE AWARE THAT SECOND HAND SMOKE IS ALSO HARMFUL."
-- DO NOT REPLY / DO NOT REPLY ALL --  -- Message is from the [facility]--    COVID-19 Universal Screening: N/A - Not about scheduling    General Patient Message      Reason for Call: Patient was told he would be getting drops and ointment but he only received ointment. Wants to make sure that he does not need the drops. Please call back as soon as possible.    Caller Information       Type Contact Phone    07/24/2020 02:38 PM Phone (Incoming) Livan Mcconnell (Self) [phone] (M)          Alternative phone number: none    Turnaround time given to caller:   \"This message will be sent to [state Provider's name]. The clinical team will fulfill your request as soon as they review your message.\"     Statement Selected

## 2023-11-30 NOTE — ASU PATIENT PROFILE, ADULT - VISION (WITH CORRECTIVE LENSES IF THE PATIENT USUALLY WEARS THEM):
taking [id number]/Partially impaired: cannot see medication labels or newsprint, but can see obstacles in path, and the surrounding layout; can count fingers at arm's length

## 2023-12-15 ENCOUNTER — APPOINTMENT (OUTPATIENT)
Dept: UROLOGY | Facility: CLINIC | Age: 38
End: 2023-12-15
Payer: COMMERCIAL

## 2023-12-15 VITALS
WEIGHT: 131 LBS | SYSTOLIC BLOOD PRESSURE: 118 MMHG | DIASTOLIC BLOOD PRESSURE: 72 MMHG | HEART RATE: 77 BPM | OXYGEN SATURATION: 98 % | BODY MASS INDEX: 24.11 KG/M2 | HEIGHT: 62 IN

## 2023-12-15 DIAGNOSIS — N39.3 STRESS INCONTINENCE (FEMALE) (MALE): ICD-10-CM

## 2023-12-15 PROCEDURE — 99212 OFFICE O/P EST SF 10 MIN: CPT

## 2023-12-15 NOTE — REASON FOR VISIT
[TextEntry] : 38 yo female who presents for stress urinary incontinence.  pt initially seen 10/2/2023. To recap: she reports that she has had SERG since a  in 2016; she has leakage with exercise, laughing, sneezing, flatus, sexual activity. She wears pads when she knows she will be exercising. She tried PFPT but did not notice any benefit/change. She denies urinary symptoms of frequency, urgency, UUI, UTI issues, hematuria, difficulty emptying.  She is  via vaginal and  deliveries. She had her 4th child 8 months ago and is still breast feeding. She denies vaginal symptoms, denies feeling of a bulge. Her Gynecologist mentioned that she thought she had a prolapse, but she denies feelings of vaginal pressure. She got her first menses since childbirth on 2023.  She has history of constipation especially post-partum but this has been managed recently. She feels this does worsen her urinary symptoms when not controlled. Genitourinary: normal external genitalia . +urethral hypermobility, + CST, Aa Ba -2 TVL 8 C -8 Ap Bp -1.  Patient was presented the options and she opted to undergo the Bulkamid procedure.  This was performed on  and she presents today for postop appointment.  Today on 12/15 patient reports that she has had no stress leakage since the procedure.  She is very happy with the outcome.  She has been playing tennis and dancing.  She denies any interval infections.  She denies issues with bladder emptying.

## 2024-07-01 NOTE — BRIEF OPERATIVE NOTE - NSICDXBRIEFPOSTOP_GEN_ALL_CORE_FT
Visit Discharge/Physician Orders     Discharge condition: Stable     Assessment of pain at discharge: mild     Anesthetic used: lido 4%     Discharge to: Home     Left via:Private automobile     Accompanied by: self     ECF/HHA: [facility]       Dressing Orders:  LEFT PRETIB : Cleanse with normal saline, cover with calcium alginate, and ABD pad, apply Profore Lite. Change M, W([facility]), F     *If Profore Lite is removed, please apply calcium alginate, dry dressing and secure. Change twice a day.*     In clinic calcium alginate ,dry dressing and spandigrip today     Treatment Orders: Eat a diet high in protein and vitamin C. Take a multiple vitamin daily unless contraindicated.       Elevate as much as possible     Consult with Infectious Disease      [facility] followup visit:    1 week Dr. HATFIELD  ____________________________  (Please note your next appointment above and if you are unable to keep, kindly give a 24 hour notice. Thank you.)     Physician signature:__________________________      If you experience any of the following, please call the Wound Care Center during business hours:     * Increase in Pain  * Temperature over 101  * Increase in drainage from your wound  * Drainage with a foul odor  * Bleeding  * Increase in swelling  * Need for compression bandage changes due to slippage, breakthrough drainage.     If you need medical attention outside of the business hours of the Wound Care Centers please contact your PCP or go to the nearest emergency room.          
POST-OP DIAGNOSIS:  Missed  06-Oct-2021 12:20:50  Philip Nesbitt

## 2024-08-01 ENCOUNTER — APPOINTMENT (OUTPATIENT)
Dept: OBGYN | Facility: CLINIC | Age: 39
End: 2024-08-01
Payer: COMMERCIAL

## 2024-08-01 PROCEDURE — 99395 PREV VISIT EST AGE 18-39: CPT

## 2024-08-01 PROCEDURE — 99459 PELVIC EXAMINATION: CPT

## 2024-10-18 NOTE — ASU DISCHARGE PLAN (ADULT/PEDIATRIC) - DO NOT DRIVE IF TAKING PAIN MEDICATION
Discussed below including Dr. Ramesh's orders with patient. Explained to contact Ángela if they do not reach out to him shortly (phone # given), he will need follow up appointment within 31-90 days of receipt of auto CPAP for efficacy & compliance, and to let us know if we may be of further assistance. He voiced understanding. Per patient he will call back to schedule an appointment with Dr. Ramesh.   NULL

## 2024-11-23 ENCOUNTER — APPOINTMENT (OUTPATIENT)
Dept: MAMMOGRAPHY | Facility: IMAGING CENTER | Age: 39
End: 2024-11-23
Payer: COMMERCIAL

## 2024-11-23 ENCOUNTER — OUTPATIENT (OUTPATIENT)
Dept: OUTPATIENT SERVICES | Facility: HOSPITAL | Age: 39
LOS: 1 days | End: 2024-11-23
Payer: COMMERCIAL

## 2024-11-23 DIAGNOSIS — Z98.890 OTHER SPECIFIED POSTPROCEDURAL STATES: Chronic | ICD-10-CM

## 2024-11-23 DIAGNOSIS — Z00.8 ENCOUNTER FOR OTHER GENERAL EXAMINATION: ICD-10-CM

## 2024-11-23 DIAGNOSIS — Z98.891 HISTORY OF UTERINE SCAR FROM PREVIOUS SURGERY: Chronic | ICD-10-CM

## 2024-11-23 PROCEDURE — 77067 SCR MAMMO BI INCL CAD: CPT

## 2024-11-23 PROCEDURE — 77067 SCR MAMMO BI INCL CAD: CPT | Mod: 26

## 2024-11-23 PROCEDURE — 77063 BREAST TOMOSYNTHESIS BI: CPT

## 2024-11-23 PROCEDURE — 77063 BREAST TOMOSYNTHESIS BI: CPT | Mod: 26

## 2024-11-27 ENCOUNTER — OUTPATIENT (OUTPATIENT)
Dept: OUTPATIENT SERVICES | Facility: HOSPITAL | Age: 39
LOS: 1 days | End: 2024-11-27
Payer: COMMERCIAL

## 2024-11-27 ENCOUNTER — APPOINTMENT (OUTPATIENT)
Dept: MAMMOGRAPHY | Facility: IMAGING CENTER | Age: 39
End: 2024-11-27
Payer: COMMERCIAL

## 2024-11-27 DIAGNOSIS — Z98.890 OTHER SPECIFIED POSTPROCEDURAL STATES: Chronic | ICD-10-CM

## 2024-11-27 DIAGNOSIS — Z98.891 HISTORY OF UTERINE SCAR FROM PREVIOUS SURGERY: Chronic | ICD-10-CM

## 2024-11-27 DIAGNOSIS — Z00.8 ENCOUNTER FOR OTHER GENERAL EXAMINATION: ICD-10-CM

## 2024-11-27 PROCEDURE — 77065 DX MAMMO INCL CAD UNI: CPT

## 2024-11-27 PROCEDURE — 77065 DX MAMMO INCL CAD UNI: CPT | Mod: 26,RT

## 2024-11-27 PROCEDURE — G0279: CPT | Mod: 26

## 2024-11-27 PROCEDURE — G0279: CPT

## 2024-12-12 ENCOUNTER — OUTPATIENT (OUTPATIENT)
Dept: OUTPATIENT SERVICES | Facility: HOSPITAL | Age: 39
LOS: 1 days | End: 2024-12-12
Payer: COMMERCIAL

## 2024-12-12 ENCOUNTER — APPOINTMENT (OUTPATIENT)
Dept: MAMMOGRAPHY | Facility: CLINIC | Age: 39
End: 2024-12-12
Payer: COMMERCIAL

## 2024-12-12 DIAGNOSIS — Z98.891 HISTORY OF UTERINE SCAR FROM PREVIOUS SURGERY: Chronic | ICD-10-CM

## 2024-12-12 DIAGNOSIS — Z00.8 ENCOUNTER FOR OTHER GENERAL EXAMINATION: ICD-10-CM

## 2024-12-12 DIAGNOSIS — Z98.890 OTHER SPECIFIED POSTPROCEDURAL STATES: Chronic | ICD-10-CM

## 2024-12-12 PROCEDURE — 88341 IMHCHEM/IMCYTCHM EA ADD ANTB: CPT

## 2024-12-12 PROCEDURE — 19081 BX BREAST 1ST LESION STRTCTC: CPT | Mod: RT

## 2024-12-12 PROCEDURE — A4648: CPT

## 2024-12-12 PROCEDURE — 19081 BX BREAST 1ST LESION STRTCTC: CPT

## 2024-12-12 PROCEDURE — 77065 DX MAMMO INCL CAD UNI: CPT | Mod: 26,RT

## 2024-12-12 PROCEDURE — 77065 DX MAMMO INCL CAD UNI: CPT

## 2024-12-12 PROCEDURE — 88342 IMHCHEM/IMCYTCHM 1ST ANTB: CPT | Mod: 26

## 2024-12-12 PROCEDURE — 88341 IMHCHEM/IMCYTCHM EA ADD ANTB: CPT | Mod: 26

## 2024-12-12 PROCEDURE — 88305 TISSUE EXAM BY PATHOLOGIST: CPT

## 2024-12-12 PROCEDURE — 88342 IMHCHEM/IMCYTCHM 1ST ANTB: CPT

## 2024-12-12 PROCEDURE — 88305 TISSUE EXAM BY PATHOLOGIST: CPT | Mod: 26

## 2024-12-27 ENCOUNTER — NON-APPOINTMENT (OUTPATIENT)
Age: 39
End: 2024-12-27

## 2025-01-08 NOTE — ASU PATIENT PROFILE, ADULT - NSALCOHOLLASTUSE_GEN_A_CORE_DT
[FreeTextEntry1] : 40yo w/ PMHx tobacco use disorder, alcohol use disorder, recurrent alcohol induced pancreatitis (most recent admission 12/2024 to [facility] requiring ICU admission for hypertriglyceredemia), hx incidentally noted pancreatic tail hypodensity, HTN presenting for evaluation of pos FIT and recurrent pancreatitis.   #Alcoholic pancreatitis #2cm pancreatic tail hypodensity He has a history of recurrent pancreatitis including 2 hospitalizations, most recently 12/2024. During that admission had lipase 138, trigylycerides 2781 and had CT showing heterogeneous enhancement of the tail of the pancreas containing low areas of attenuation with peripancreatic edema and trace fluid extending into the retroperitoneal spaces, compatible with acute pancreatitis. Nonspecific 2 cm pancreatic tail hypodensity (39:2). Differential includes necrosis, walled off collection or underlying lesion. He had reported drinking 5-6 brandies for the week prior to admission. Etiology of his pancreatitis was thought secondary to either alcohol use or hypertriglycerdemia (likely in setting heavy EtOH use). No stones were seen on imaging and CBD was 7mm. Was treated with insulin ggt in ICU. He now denies any alcohol since discharge.   #Alcohol use disorder  #Alcoholic Hepatic steatosis with hepatomegaly and elevated liver chemistries Long standing history of heavy alcohol use that is being closely monitored by his PCP. Reports he typically drinks over the holidays but is able to go periods without significant drinking and has not drank since discharge. He may be interested in meds in the future. In the past appears he was referred to [facility] however he reports it is too difficult to attend in person given his job () and being single dad to 15yo. On admission to [facility] recently liver enzymes were mildly elevated to AST//125 which improved to 88/98 on discharge with TB 3.3 on discharge. His MDF was 7 at its highest and the etiology of elevations in liver chemistries was thought to be secondary to alcohol use. U/S of liver over the summer showing only hepatic steatosis. Imaging while inpatient again showing hepatic steatosis though his albumin was low at 1.8 and his plts were low at 147.   #Positive FIT test He was referred to GI reportedly for a positive FIT test sent out by PCP.  He denies any family history of colon cancer or colonic polyps. He denies any GI symptoms including hematochezia, melena, constipation or diarrhea. He has no upper GI sxs. He has never had colon cancers screening in past.   CT 12/28/2024: ACC: [id number] EXAM: CT ABDOMEN AND PELVIS IC ORDERED BY: AZUCENA SINGH PROCEDURE DATE: 12/28/2024 INTERPRETATION: CLINICAL INFORMATION: Left-sided abdominal pain. History of pancreatitis. COMPARISON: CT abdomen and pelvis 12/18/2023. CONTRAST/COMPLICATIONS: IV Contrast: Omnipaque 350 90 cc administered 0 cc discarded Oral Contrast: NONE PROCEDURE: CT of the Abdomen and Pelvis was performed. Sagittal and coronal reformats were performed.  FINDINGS: LOWER CHEST: Mild bibasilar dependent atelectasis.  LIVER: Marked steatosis. BILE DUCTS: Normal caliber. GALLBLADDER: Within normal limits. SPLEEN: Within normal limits. PANCREAS: Heterogeneous enhancement of the tail of the pancreas containing low areas of attenuation with peripancreatic edema and trace fluid extending into the retroperitoneal spaces, compatible with acute pancreatitis. Nonspecific 2 cm pancreatic tail hypodensity (39:2). Differential includes necrosis, walled off collection or underlying lesion. Consider nonemergent MRI for better evaluation. ADRENALS: Within normal limits. KIDNEYS/URETERS: Trace nonspecific bilateral perinephric stranding without hydronephrosis. Small left renal cyst as well as too small to characterize bilateral hypodensities.  BLADDER: Within normal limits. REPRODUCTIVE ORGANS: Prostate within normal limits.  BOWEL: No bowel obstruction. Normal appendix. PERITONEUM/RETROPERITONEUM: Within normal limits. VESSELS: Atherosclerotic changes. LYMPH NODES: No lymphadenopathy. ABDOMINAL WALL: Small bilateral groin hernias containing fat. BONES: Degenerative changes.  IMPRESSION: Findings compatible with acute pancreatitis. Nonspecific 2 cm pancreatic tail hypodensity (39:2). Differential includes necrosis, walled off collection or underlying lesion. Consider nonemergent MRI for better evaluation.  Findings were discussed with Dr. Singh 12/28/2024 9:23 PM by Dr. Pradhan with read back confirmation.
[FreeTextEntry1] : 40yo w/ PMHx tobacco use disorder, alcohol use disorder, recurrent alcohol induced pancreatitis (most recent admission 12/2024 to [facility] requiring ICU admission for hypertriglyceredemia), hx incidentally noted pancreatic tail hypodensity, HTN presenting for evaluation of pos FIT and recurrent pancreatitis.   #Alcoholic pancreatitis #2cm pancreatic tail hypodensity He has a history of recurrent pancreatitis including 2 hospitalizations, most recently 12/2024. During that admission had lipase 138, trigylycerides 2781 and had CT showing heterogeneous enhancement of the tail of the pancreas containing low areas of attenuation with peripancreatic edema and trace fluid extending into the retroperitoneal spaces, compatible with acute pancreatitis. Nonspecific 2 cm pancreatic tail hypodensity (39:2). Differential includes necrosis, walled off collection or underlying lesion. He had reported drinking 5-6 brandies for the week prior to admission. Etiology of his pancreatitis was thought secondary to either alcohol use or hypertriglycerdemia (likely in setting heavy EtOH use). No stones were seen on imaging and CBD was 7mm. Was treated with insulin ggt in ICU. He now denies any alcohol since discharge.   #Alcohol use disorder  #Alcoholic Hepatic steatosis with hepatomegaly and elevated liver chemistries Long standing history of heavy alcohol use that is being closely monitored by his PCP. Reports he typically drinks over the holidays but is able to go periods without significant drinking and has not drank since discharge. He may be interested in meds in the future. In the past appears he was referred to [facility] however he reports it is too difficult to attend in person given his job () and being single dad to 17yo. On admission to [facility] recently liver enzymes were mildly elevated to AST//125 which improved to 88/98 on discharge with TB 3.3 on discharge. His MDF was 7 at its highest and the etiology of elevations in liver chemistries was thought to be secondary to alcohol use. U/S of liver over the summer showing only hepatic steatosis. Imaging while inpatient again showing hepatic steatosis though his albumin was low at 1.8 and his plts were low at 147.   #Positive FIT test He was referred to GI reportedly for a positive FIT test sent out by PCP.  He denies any family history of colon cancer or colonic polyps. He denies any GI symptoms including hematochezia, melena, constipation or diarrhea. He has no upper GI sxs. He has never had colon cancers screening in past.   CT 12/28/2024: ACC: [id number] EXAM: CT ABDOMEN AND PELVIS IC ORDERED BY: AZUCENA SINGH PROCEDURE DATE: 12/28/2024 INTERPRETATION: CLINICAL INFORMATION: Left-sided abdominal pain. History of pancreatitis. COMPARISON: CT abdomen and pelvis 12/18/2023. CONTRAST/COMPLICATIONS: IV Contrast: Omnipaque 350 90 cc administered 0 cc discarded Oral Contrast: NONE PROCEDURE: CT of the Abdomen and Pelvis was performed. Sagittal and coronal reformats were performed.  FINDINGS: LOWER CHEST: Mild bibasilar dependent atelectasis.  LIVER: Marked steatosis. BILE DUCTS: Normal caliber. GALLBLADDER: Within normal limits. SPLEEN: Within normal limits. PANCREAS: Heterogeneous enhancement of the tail of the pancreas containing low areas of attenuation with peripancreatic edema and trace fluid extending into the retroperitoneal spaces, compatible with acute pancreatitis. Nonspecific 2 cm pancreatic tail hypodensity (39:2). Differential includes necrosis, walled off collection or underlying lesion. Consider nonemergent MRI for better evaluation. ADRENALS: Within normal limits. KIDNEYS/URETERS: Trace nonspecific bilateral perinephric stranding without hydronephrosis. Small left renal cyst as well as too small to characterize bilateral hypodensities.  BLADDER: Within normal limits. REPRODUCTIVE ORGANS: Prostate within normal limits.  BOWEL: No bowel obstruction. Normal appendix. PERITONEUM/RETROPERITONEUM: Within normal limits. VESSELS: Atherosclerotic changes. LYMPH NODES: No lymphadenopathy. ABDOMINAL WALL: Small bilateral groin hernias containing fat. BONES: Degenerative changes.  IMPRESSION: Findings compatible with acute pancreatitis. Nonspecific 2 cm pancreatic tail hypodensity (39:2). Differential includes necrosis, walled off collection or underlying lesion. Consider nonemergent MRI for better evaluation.  Findings were discussed with Dr. Singh 12/28/2024 9:23 PM by Dr. Pradhan with read back confirmation.
02-Oct-2021

## 2025-02-21 ENCOUNTER — OUTPATIENT (OUTPATIENT)
Dept: OUTPATIENT SERVICES | Facility: HOSPITAL | Age: 40
LOS: 1 days | End: 2025-02-21
Payer: COMMERCIAL

## 2025-02-21 VITALS
WEIGHT: 128.09 LBS | SYSTOLIC BLOOD PRESSURE: 129 MMHG | HEART RATE: 97 BPM | TEMPERATURE: 99 F | OXYGEN SATURATION: 98 % | HEIGHT: 62.5 IN | DIASTOLIC BLOOD PRESSURE: 86 MMHG | RESPIRATION RATE: 16 BRPM

## 2025-02-21 DIAGNOSIS — Z98.890 OTHER SPECIFIED POSTPROCEDURAL STATES: Chronic | ICD-10-CM

## 2025-02-21 DIAGNOSIS — Z01.818 ENCOUNTER FOR OTHER PREPROCEDURAL EXAMINATION: ICD-10-CM

## 2025-02-21 DIAGNOSIS — Z12.39 ENCOUNTER FOR OTHER SCREENING FOR MALIGNANT NEOPLASM OF BREAST: ICD-10-CM

## 2025-02-21 DIAGNOSIS — C50.919 MALIGNANT NEOPLASM OF UNSPECIFIED SITE OF UNSPECIFIED FEMALE BREAST: ICD-10-CM

## 2025-02-21 DIAGNOSIS — D05.01 LOBULAR CARCINOMA IN SITU OF RIGHT BREAST: ICD-10-CM

## 2025-02-21 DIAGNOSIS — Z98.891 HISTORY OF UTERINE SCAR FROM PREVIOUS SURGERY: Chronic | ICD-10-CM

## 2025-02-21 LAB
ANION GAP SERPL CALC-SCNC: 14 MMOL/L — SIGNIFICANT CHANGE UP (ref 5–17)
BUN SERPL-MCNC: 11 MG/DL — SIGNIFICANT CHANGE UP (ref 7–23)
CALCIUM SERPL-MCNC: 9.8 MG/DL — SIGNIFICANT CHANGE UP (ref 8.4–10.5)
CHLORIDE SERPL-SCNC: 100 MMOL/L — SIGNIFICANT CHANGE UP (ref 96–108)
CO2 SERPL-SCNC: 23 MMOL/L — SIGNIFICANT CHANGE UP (ref 22–31)
CREAT SERPL-MCNC: 0.46 MG/DL — LOW (ref 0.5–1.3)
EGFR: 124 ML/MIN/1.73M2 — SIGNIFICANT CHANGE UP
GLUCOSE SERPL-MCNC: 87 MG/DL — SIGNIFICANT CHANGE UP (ref 70–99)
HCT VFR BLD CALC: 37.5 % — SIGNIFICANT CHANGE UP (ref 34.5–45)
HGB BLD-MCNC: 12.7 G/DL — SIGNIFICANT CHANGE UP (ref 11.5–15.5)
MCHC RBC-ENTMCNC: 30.4 PG — SIGNIFICANT CHANGE UP (ref 27–34)
MCHC RBC-ENTMCNC: 33.9 G/DL — SIGNIFICANT CHANGE UP (ref 32–36)
MCV RBC AUTO: 89.7 FL — SIGNIFICANT CHANGE UP (ref 80–100)
NRBC BLD AUTO-RTO: 0 /100 WBCS — SIGNIFICANT CHANGE UP (ref 0–0)
PLATELET # BLD AUTO: 316 K/UL — SIGNIFICANT CHANGE UP (ref 150–400)
POTASSIUM SERPL-MCNC: 3.8 MMOL/L — SIGNIFICANT CHANGE UP (ref 3.5–5.3)
POTASSIUM SERPL-SCNC: 3.8 MMOL/L — SIGNIFICANT CHANGE UP (ref 3.5–5.3)
RBC # BLD: 4.18 M/UL — SIGNIFICANT CHANGE UP (ref 3.8–5.2)
RBC # FLD: 12.8 % — SIGNIFICANT CHANGE UP (ref 10.3–14.5)
SODIUM SERPL-SCNC: 137 MMOL/L — SIGNIFICANT CHANGE UP (ref 135–145)
WBC # BLD: 9.91 K/UL — SIGNIFICANT CHANGE UP (ref 3.8–10.5)
WBC # FLD AUTO: 9.91 K/UL — SIGNIFICANT CHANGE UP (ref 3.8–10.5)

## 2025-02-21 PROCEDURE — G0463: CPT

## 2025-02-21 PROCEDURE — 80048 BASIC METABOLIC PNL TOTAL CA: CPT

## 2025-02-21 PROCEDURE — 85027 COMPLETE CBC AUTOMATED: CPT

## 2025-02-21 RX ORDER — SODIUM CHLORIDE 9 G/1000ML
1000 INJECTION, SOLUTION INTRAVENOUS
Refills: 0 | Status: DISCONTINUED | OUTPATIENT
Start: 2025-03-11 | End: 2025-03-25

## 2025-02-21 NOTE — H&P PST ADULT - NSICDXPASTSURGICALHX_GEN_ALL_CORE_FT
PAST SURGICAL HISTORY:  History of D&C 2018    S/P  section x1    S/P correction of deviated nasal septum     S/P dilatation and curettage     S/P injection of urethral bulking agent      PAST SURGICAL HISTORY:  H/O breast biopsy     History of D&C 2018    S/P  section x1    S/P correction of deviated nasal septum     S/P dilatation and curettage     S/P injection of urethral bulking agent

## 2025-02-21 NOTE — H&P PST ADULT - HISTORY OF PRESENT ILLNESS
39 yr old female with pmhx of SERG 2016 s/p urethral bulking (11/2023), right breast LCIS. Pt states LCIS was found on routine mammogram and breast biopsy. She report some right breast discomfort s/p breast biopsy but denies any nipple discharge or breast lump. Denies any chest pain, palpitations, SOB, N/V, fever or chills. She now presents to PST prior to scheduled Right Breast Excisional Biopsy Post Right Breast Hologic Localization with Dr. Chambers on 3/11/25.       39 yr old female with pmhx of SERG 2016 s/p urethral bulking (11/2023), right breast LCIS. Pt states LCIS was found on routine mammogram and breast biopsy 2024. She report some right breast discomfort s/p breast biopsy in 12/2024, but denies any nipple discharge or breast lump. Denies any chest pain, palpitations, SOB, N/V, fever or chills. She now presents to PST prior to scheduled Right Breast Excisional Biopsy Post Right Breast Hologic Localization with Dr. Chambers on 3/11/25.

## 2025-02-21 NOTE — H&P PST ADULT - ATTENDING COMMENTS
I have personally seen and evaluate the patient and there are no changes. Plan for right breast seed localized excisional biopsy

## 2025-02-21 NOTE — H&P PST ADULT - ASSESSMENT
DASI Score: 9.89  DASI Activity: Pt does cardio exercise 3-5 times a week, able to go up one flight of stairs or walk 1-2 blocks without difficulty  Loose or removable teeth: denies

## 2025-02-21 NOTE — H&P PST ADULT - PROBLEM SELECTOR PLAN 1
Pt. scheduled for Right Breast Excisional Biopsy Post Right Breast Hologic Localization with Dr. Chambers on 3/11/25.  Pre-op instructions given, all questions answered.  Surgical Soap and specimen cup given.  Labs: CBC, BMP,

## 2025-02-25 PROBLEM — C50.919 MALIGNANT NEOPLASM OF UNSPECIFIED SITE OF UNSPECIFIED FEMALE BREAST: Chronic | Status: ACTIVE | Noted: 2025-02-21

## 2025-03-06 ENCOUNTER — APPOINTMENT (OUTPATIENT)
Dept: MAMMOGRAPHY | Facility: IMAGING CENTER | Age: 40
End: 2025-03-06
Payer: COMMERCIAL

## 2025-03-06 ENCOUNTER — OUTPATIENT (OUTPATIENT)
Dept: OUTPATIENT SERVICES | Facility: HOSPITAL | Age: 40
LOS: 1 days | End: 2025-03-06
Payer: COMMERCIAL

## 2025-03-06 DIAGNOSIS — Z98.891 HISTORY OF UTERINE SCAR FROM PREVIOUS SURGERY: Chronic | ICD-10-CM

## 2025-03-06 DIAGNOSIS — Z98.890 OTHER SPECIFIED POSTPROCEDURAL STATES: Chronic | ICD-10-CM

## 2025-03-06 DIAGNOSIS — Z00.8 ENCOUNTER FOR OTHER GENERAL EXAMINATION: ICD-10-CM

## 2025-03-06 PROCEDURE — A4648: CPT

## 2025-03-06 PROCEDURE — 19281 PERQ DEVICE BREAST 1ST IMAG: CPT

## 2025-03-06 PROCEDURE — 19281 PERQ DEVICE BREAST 1ST IMAG: CPT | Mod: RT

## 2025-03-11 ENCOUNTER — TRANSCRIPTION ENCOUNTER (OUTPATIENT)
Age: 40
End: 2025-03-11

## 2025-03-11 ENCOUNTER — OUTPATIENT (OUTPATIENT)
Dept: OUTPATIENT SERVICES | Facility: HOSPITAL | Age: 40
LOS: 1 days | End: 2025-03-11
Payer: COMMERCIAL

## 2025-03-11 VITALS
WEIGHT: 128.09 LBS | DIASTOLIC BLOOD PRESSURE: 72 MMHG | OXYGEN SATURATION: 100 % | RESPIRATION RATE: 16 BRPM | HEART RATE: 58 BPM | HEIGHT: 62.5 IN | SYSTOLIC BLOOD PRESSURE: 106 MMHG | TEMPERATURE: 97 F

## 2025-03-11 VITALS
OXYGEN SATURATION: 98 % | SYSTOLIC BLOOD PRESSURE: 105 MMHG | RESPIRATION RATE: 17 BRPM | DIASTOLIC BLOOD PRESSURE: 56 MMHG | HEART RATE: 69 BPM

## 2025-03-11 DIAGNOSIS — D05.01 LOBULAR CARCINOMA IN SITU OF RIGHT BREAST: ICD-10-CM

## 2025-03-11 DIAGNOSIS — Z98.890 OTHER SPECIFIED POSTPROCEDURAL STATES: Chronic | ICD-10-CM

## 2025-03-11 DIAGNOSIS — Z98.891 HISTORY OF UTERINE SCAR FROM PREVIOUS SURGERY: Chronic | ICD-10-CM

## 2025-03-11 DIAGNOSIS — Z12.39 ENCOUNTER FOR OTHER SCREENING FOR MALIGNANT NEOPLASM OF BREAST: ICD-10-CM

## 2025-03-11 PROCEDURE — 76098 X-RAY EXAM SURGICAL SPECIMEN: CPT | Mod: 26

## 2025-03-11 PROCEDURE — 76098 X-RAY EXAM SURGICAL SPECIMEN: CPT

## 2025-03-11 PROCEDURE — 88307 TISSUE EXAM BY PATHOLOGIST: CPT | Mod: 26

## 2025-03-11 PROCEDURE — 88307 TISSUE EXAM BY PATHOLOGIST: CPT

## 2025-03-11 PROCEDURE — 19125 EXCISION BREAST LESION: CPT | Mod: RT

## 2025-03-11 PROCEDURE — 14000 TIS TRNFR TRUNK 10 SQ CM/<: CPT

## 2025-03-11 RX ORDER — APREPITANT 40 MG/1
40 CAPSULE ORAL ONCE
Refills: 0 | Status: COMPLETED | OUTPATIENT
Start: 2025-03-11 | End: 2025-03-11

## 2025-03-11 RX ORDER — OXYCODONE HYDROCHLORIDE 30 MG/1
5 TABLET ORAL EVERY 6 HOURS
Refills: 0 | Status: DISCONTINUED | OUTPATIENT
Start: 2025-03-11 | End: 2025-03-11

## 2025-03-11 RX ORDER — OXYCODONE HYDROCHLORIDE 30 MG/1
5 TABLET ORAL ONCE
Refills: 0 | Status: DISCONTINUED | OUTPATIENT
Start: 2025-03-11 | End: 2025-03-11

## 2025-03-11 RX ORDER — OXYCODONE HYDROCHLORIDE 30 MG/1
1 TABLET ORAL
Qty: 10 | Refills: 0
Start: 2025-03-11 | End: 2025-03-13

## 2025-03-11 RX ORDER — FENTANYL CITRATE-0.9 % NACL/PF 100MCG/2ML
25 SYRINGE (ML) INTRAVENOUS
Refills: 0 | Status: DISCONTINUED | OUTPATIENT
Start: 2025-03-11 | End: 2025-03-11

## 2025-03-11 RX ORDER — ONDANSETRON HCL/PF 4 MG/2 ML
4 VIAL (ML) INJECTION ONCE
Refills: 0 | Status: DISCONTINUED | OUTPATIENT
Start: 2025-03-11 | End: 2025-03-25

## 2025-03-11 RX ORDER — LIDOCAINE HCL/PF 10 MG/ML
0.2 VIAL (ML) INJECTION ONCE
Refills: 0 | Status: COMPLETED | OUTPATIENT
Start: 2025-03-11 | End: 2025-03-11

## 2025-03-11 RX ADMIN — Medication 1 APPLICATION(S): at 06:59

## 2025-03-11 RX ADMIN — SODIUM CHLORIDE 100 MILLILITER(S): 9 INJECTION, SOLUTION INTRAVENOUS at 06:56

## 2025-03-11 NOTE — ASU DISCHARGE PLAN (ADULT/PEDIATRIC) - CARE PROVIDER_API CALL
Kinga Chambers  Surgery  [address], [address]  [place] [address]  Phone: [phone]  Fax: [phone]  Established Patient  Follow Up Time: 2 weeks

## 2025-03-11 NOTE — ASU DISCHARGE PLAN (ADULT/PEDIATRIC) - ASU DC SPECIAL INSTRUCTIONSFT
Follow up with Dr. Chambers in 2 weeks. Call the office to schedule your appointment. [phone]    For the first 24 hours you may place an ice pack to breast, for up to 20 min at a time. Not directly to skin.  Wear surgical bra 24/7 until your follow up appointment. You may remove it for a shower and to wash it.   Alternate between Tylenol (1000 mg) and Motrin (600 mg) every 3 hours. Tylenol at 9 am, Motrin at 12 pm, Tylenol at 3 pm, etc for the first 48 hours and then as needed.

## 2025-03-11 NOTE — ASU DISCHARGE PLAN (ADULT/PEDIATRIC) - FINANCIAL ASSISTANCE
[facility] provides services at a reduced cost to those who are determined to be eligible through [facility]’s financial assistance program. Information regarding [facility]’s financial assistance program can be found by going to [email or web address] or by calling [phone].

## 2025-03-11 NOTE — ASU DISCHARGE PLAN (ADULT/PEDIATRIC) - NS MD DC FALL RISK RISK
For information on Fall & Injury Prevention, visit: [email or web address] OR  [email or web address] OR  https://www.cdc.gov/steadi/patient.html

## 2025-03-11 NOTE — ASU PATIENT PROFILE, ADULT - NSICDXPASTSURGICALHX_GEN_ALL_CORE_FT
PAST SURGICAL HISTORY:  H/O breast biopsy     History of D&C 2018    S/P  section x1    S/P correction of deviated nasal septum     S/P dilatation and curettage     S/P injection of urethral bulking agent

## 2025-03-11 NOTE — ASU DISCHARGE PLAN (ADULT/PEDIATRIC) - MEDICATION INSTRUCTIONS
Take 2 tablets of extra strength Tylenol (1000 mg) and 2-3 tablets of Ibuprofen (400 - 600mg) every 6 hours. Alternate so you are taking something every 3 hours.

## 2025-03-17 LAB — SURGICAL PATHOLOGY STUDY: SIGNIFICANT CHANGE UP

## 2025-05-27 NOTE — H&P PST ADULT - NS HP PST ANES REACTION
Doing well except for increased LE edema bilaterally with 7lb wt gain last two weeks.    Denies CP.  Mild stable SOB.  02 98%.  Chest CTA.  Cv RRR without murmers.  JVP not elevated.  Ext + 2 pedal edema.  Neg Moon's.  PIH labs ordered.    Discussed kick counts and fetal movement.  Reportable signs and symptoms discussed.  RTC in 2 weeks  APFT starting in 2 weeks.  Wkly BPP.  Growth US scheduled.   Denies PTL sx, vb, lof  Jeevan Oconnell MD  5/27/2025      No

## 2025-06-26 NOTE — H&P PST ADULT - NEGATIVE CARDIOVASCULAR SYMPTOMS
[As Noted in HPI] : as noted in HPI [Negative] : Heme/Lymph no chest pain/no palpitations/no dyspnea on exertion

## 2025-07-29 ENCOUNTER — APPOINTMENT (OUTPATIENT)
Dept: OBGYN | Facility: CLINIC | Age: 40
End: 2025-07-29
Payer: COMMERCIAL

## 2025-07-29 PROCEDURE — 99459 PELVIC EXAMINATION: CPT

## 2025-07-29 PROCEDURE — 99396 PREV VISIT EST AGE 40-64: CPT

## 2025-07-29 PROCEDURE — 96127 BRIEF EMOTIONAL/BEHAV ASSMT: CPT

## (undated) DEVICE — DRAPE INSTRUMENT POUCH 6.75" X 11"

## (undated) DEVICE — SOL IRR POUR NS 0.9% 500ML

## (undated) DEVICE — NDL HYPO REGULAR BEVEL 22G X 1.5" (TURQUOISE)

## (undated) DEVICE — WARMING BLANKET LOWER ADULT

## (undated) DEVICE — SUT POLYSORB 3-0 30" V-20 UNDYED

## (undated) DEVICE — SUT MONOCRYL 4-0 27" PS-2 UNDYED

## (undated) DEVICE — DRSG TELFA 3 X 8

## (undated) DEVICE — SPECIMEN CONTAINER 100ML

## (undated) DEVICE — DRAPE LAVH 124" X 30" X125"

## (undated) DEVICE — SYR LUER LOK 10CC

## (undated) DEVICE — BRA PINK 2XL 42-45"

## (undated) DEVICE — VENODYNE/SCD SLEEVE CALF MEDIUM

## (undated) DEVICE — ELCTR BOVIE PENCIL SMOKE EVACUATION

## (undated) DEVICE — GLV 6 PROTEXIS (WHITE)

## (undated) DEVICE — NDL HYPO REGULAR BEVEL 25G X 1.5" (BLUE)

## (undated) DEVICE — DRAPE SPLIT SHEET 77" X 108"

## (undated) DEVICE — SUT SOFSILK 2-0 18" C-23

## (undated) DEVICE — PACK LITHOTOMY

## (undated) DEVICE — POSITIONER STRAP ARMBOARD VELCRO TS-30

## (undated) DEVICE — MEDICATION LABELS W MARKER

## (undated) DEVICE — POSITIONER PATIENT SAFETY STRAP 3X60"

## (undated) DEVICE — TUBING TUR 2 PRONG

## (undated) DEVICE — DRAPE TOWEL BLUE 17" X 24"

## (undated) DEVICE — CANISTER DISPOSABLE THIN WALL 3000CC

## (undated) DEVICE — PACK MINOR

## (undated) DEVICE — DRAPE 1/2 SHEET 40X57"

## (undated) DEVICE — ELCTR BOVIE TIP BLADE INSULATED 2.75" EDGE

## (undated) DEVICE — SOL IRR BAG NS 0.9% 1000ML

## (undated) DEVICE — WARMING BLANKET UPPER ADULT

## (undated) DEVICE — SOL IRR POUR H2O 250ML

## (undated) DEVICE — DRSG CURITY GAUZE SPONGE 4 X 4" 12-PLY

## (undated) DEVICE — DRSG DERMABOND 0.7ML